# Patient Record
Sex: MALE | Race: WHITE
[De-identification: names, ages, dates, MRNs, and addresses within clinical notes are randomized per-mention and may not be internally consistent; named-entity substitution may affect disease eponyms.]

---

## 2017-08-04 ENCOUNTER — HOSPITAL ENCOUNTER (INPATIENT)
Dept: HOSPITAL 50 - VM.ED | Age: 82
LOS: 4 days | Discharge: HOME | DRG: 190 | End: 2017-08-08
Attending: INTERNAL MEDICINE | Admitting: INTERNAL MEDICINE
Payer: MEDICARE

## 2017-08-04 DIAGNOSIS — E11.21: ICD-10-CM

## 2017-08-04 DIAGNOSIS — I50.31: ICD-10-CM

## 2017-08-04 DIAGNOSIS — D64.9: ICD-10-CM

## 2017-08-04 DIAGNOSIS — G89.29: ICD-10-CM

## 2017-08-04 DIAGNOSIS — E29.1: ICD-10-CM

## 2017-08-04 DIAGNOSIS — I48.91: ICD-10-CM

## 2017-08-04 DIAGNOSIS — M54.9: ICD-10-CM

## 2017-08-04 DIAGNOSIS — F17.210: ICD-10-CM

## 2017-08-04 DIAGNOSIS — I50.23: Primary | ICD-10-CM

## 2017-08-04 DIAGNOSIS — J44.1: ICD-10-CM

## 2017-08-04 DIAGNOSIS — E11.65: ICD-10-CM

## 2017-08-04 DIAGNOSIS — J96.01: ICD-10-CM

## 2017-08-04 DIAGNOSIS — Z79.4: ICD-10-CM

## 2017-08-04 DIAGNOSIS — E66.9: ICD-10-CM

## 2017-08-04 DIAGNOSIS — M25.559: ICD-10-CM

## 2017-08-04 DIAGNOSIS — Z79.899: ICD-10-CM

## 2017-08-04 LAB
BASE EXCESS BLDA CALC-SCNC: 2 MMOL/L (ref -2–3)
CHLORIDE SERPL-SCNC: 98 MMOL/L (ref 98–107)
HCO3 BLDA-SCNC: 28 MMOL/L (ref 22–26)
O2 FLOW RATE: 3 L/MIN
PCO2 BLDA: 53 MMHG (ref 35–45)
PO2 BLDA: 78 MMHG (ref 80–105)
SODIUM SERPL-SCNC: 133 MMOL/L (ref 136–145)

## 2017-08-04 PROCEDURE — 83735 ASSAY OF MAGNESIUM: CPT

## 2017-08-04 PROCEDURE — 85025 COMPLETE CBC W/AUTO DIFF WBC: CPT

## 2017-08-04 PROCEDURE — 84484 ASSAY OF TROPONIN QUANT: CPT

## 2017-08-04 PROCEDURE — 36415 COLL VENOUS BLD VENIPUNCTURE: CPT

## 2017-08-04 PROCEDURE — 82803 BLOOD GASES ANY COMBINATION: CPT

## 2017-08-04 PROCEDURE — 36600 WITHDRAWAL OF ARTERIAL BLOOD: CPT

## 2017-08-04 PROCEDURE — 71010: CPT

## 2017-08-04 PROCEDURE — 83880 ASSAY OF NATRIURETIC PEPTIDE: CPT

## 2017-08-04 PROCEDURE — 96361 HYDRATE IV INFUSION ADD-ON: CPT

## 2017-08-04 PROCEDURE — 96375 TX/PRO/DX INJ NEW DRUG ADDON: CPT

## 2017-08-04 PROCEDURE — 99284 EMERGENCY DEPT VISIT MOD MDM: CPT

## 2017-08-04 PROCEDURE — 99285 EMERGENCY DEPT VISIT HI MDM: CPT

## 2017-08-04 PROCEDURE — 96374 THER/PROPH/DIAG INJ IV PUSH: CPT

## 2017-08-04 PROCEDURE — 94640 AIRWAY INHALATION TREATMENT: CPT

## 2017-08-04 PROCEDURE — 93005 ELECTROCARDIOGRAM TRACING: CPT

## 2017-08-04 PROCEDURE — 80053 COMPREHEN METABOLIC PANEL: CPT

## 2017-08-04 RX ADMIN — Medication PRN ML: at 14:50

## 2017-08-04 RX ADMIN — Medication SCH MG: at 10:00

## 2017-08-04 RX ADMIN — INSULIN ASPART SCH UNITS: 100 INJECTION, SUSPENSION SUBCUTANEOUS at 17:31

## 2017-08-04 RX ADMIN — VITAMIN D, TAB 1000IU (100/BT) SCH UNITS: 25 TAB at 10:00

## 2017-08-04 RX ADMIN — Medication SCH MG: at 17:34

## 2017-08-04 RX ADMIN — DILTIAZEM HYDROCHLORIDE SCH MG: 180 CAPSULE, COATED, EXTENDED RELEASE ORAL at 10:01

## 2017-08-04 RX ADMIN — INSULIN ASPART SCH UNITS: 100 INJECTION, SUSPENSION SUBCUTANEOUS at 09:08

## 2017-08-04 NOTE — HP
HISTORY:  An 82-year-old who came into the emergency room due to respiratory

distress during the middle of the night.  The patient had noted increasing cough

since Wednesday evening.  He had not had any fever or chills.  He had been

coughing up some phlegm.  He is an active smoker at least 2 cigarettes per day

and has known COPD.  He was wheezing.  His last exacerbation was actually a

couple of years ago, but he did not end up getting hospitalized, otherwise he

has diabetes.  He has no history of heart failure, but he has chronic atrial

fibrillation.  His last echo was done in  and showed his EF of 55%.  He is

not on any oxygen at home.  He has not had a recent PFTs to assess the severity

of his COPD.

 

PAST MEDICAL HISTORY:  Lumbar and thoracic compression fractures, so he does

have some kyphosis; male hypogonadism; diabetic nephropathy; chronic anemia, but

improved on iron; chronic COPD; hyperlipidemia; active smoking; chronic atrial

fibrillation, on Eliquis; type 2 diabetes, controlled on insulin with

complications of diabetic nephropathy; osteoporosis; posttraumatic arthritis of

the knee; right hip pain with probably some radicular pain from the spine, but

with also moderate to severe osteoarthritis.  He did get a trochanteric bursitis

injection on  by Ortho.

 

PAST SURGICAL HISTORY:  He has had a previous colonoscopy.

 

FAMILY HISTORY:  He does have a brother who has atrial fibrillation and who has

had heart failure and diabetes.  He also has MS.  He has a sister who is living.

His parents are both .  Mom had heart failure and diabetes.  Dad had

COPD.

 

SOCIAL HISTORY:  He is .  Lives with his wife at home.  He is retired

from farming.  He does not drink.  He quit after a DUI many years ago.  They

never had any children.  He benito in [place].

 

ALLERGIES:  None.

 

MEDICATIONS:  List is reviewed.  He is on hydrocodone, he usually takes about 1

of the 7.5/325 pills per day; Cardizem 180 daily; NovoLog 70/30, 15 units

b.i.d.; Lisinopril 5 units daily; vitamin D daily; Pravachol 40 mg daily;

Victoza 1.8 daily; Spiriva daily, he is using it; otherwise metformin 1000 mg in

the morning and 500 in the evening; Vistaril as needed for itching; metoprolol

100 mg daily; Eliquis 5 mg b.i.d.; Flonase; ferrous gluconate; and vitamin C.

 

REVIEW OF SYSTEMS:

General:  He has actually lost some weight over the last 6 months or so.  He is

down over 15 pounds.  He has not gained any weight back recently, but his wife

has noted more fluid retention.

HEENT:  Otherwise no sore throat.

Cardiac:  No chest pain.  He has chronic atrial fibrillation.

Lungs:  Otherwise respiratory as stated in HPI.

Abdomen:  No new abdominal pain, nausea, vomiting or diarrhea.

Musculoskeletal:  He continues to have the right hip pain.

Neurologic:  He has no issues with memory.

 

Otherwise all systems reviewed and found to be negative unless otherwise stated.

 

PHYSICAL EXAMINATION:

Vital signs:  His weight was 97.2 kg, temperature 97.6, in fact clinic weight

was 97.6 kg just last week.  His pulse is 70, blood pressure 150/75, respiratory

rate 20, O2 94 on 2 L, but he was 88% on room air.

General:  He was in no acute distress.

Cardiac:  His heart was irregularly irregular.

Respiratory:  His lungs sounds were decreased with expiratory rhonchi

bilaterally.  No wheezing was appreciated.

Abdomen:  Mildly distended, but soft, nontender.

Extremities:  Warm and dry.  He does have 2+ edema to his mid shin.

Mental status:  He is alert and orientated x3.

Back:  His spine is kyphotic which is not new.

 

LABORATORY DATA:  EKG was reviewed which did show him to be in atrial flutter.

When compared to his last EKG at [place] from  was mostly similar

appearance, but actually the rate is better, it was 113 then, it is 82 now.

Otherwise, it should be noted that his chest x-ray did not show any infiltrates.

Otherwise lab work did show a normal white count of 9.4, hemoglobin 12,

platelets 156.  ABGs showed a pH of 7.33, pCO2 53, PO2 78, bicarb 28, sodium

133, potassium 5.1, chloride 98, bicarb 27, BUN 19, creatinine 0.9, glucose 177,

calcium 8.6.  ALT, AST, bilirubin all normal.  Albumin 3.4, proBNP 5101

 

ASSESSMENT:

1. Acute hypoxic respiratory failure secondary to a chronic obstruction

    pulmonary disease exacerbation.

2. Chronic obstruction pulmonary disease exacerbation with known underlying

    chronic obstruction pulmonary disease but unknown severity.

3. Likely right heart failure due to the chronic obstruction pulmonary disease

    with some acute on chronic diastolic heart failure which is a new onset

    with a known ejection fraction of 55% back in 2013.

4. Type 2 diabetes, controlled on long-term insulin with complications of

    nephropathy.

5. Chronic hypogonadism with osteoporosis and previous compression fractures.

6. Chronic back and hip pain, on minimal doses of hydrocodone like 1 pill per

    day.

7. Osteoarthritis of the knees.

8. Mild chronic anemia.

9. Chronic atrial fibrillation, on Eliquis.

 

PLAN:  At this point, the patient will be admitted for acute cares.  He did

receive 125 of Solu-Medrol in the ER and nebulizers and also 20 mg of Lasix.  At

this point, I am going to schedule Lasix 20 mg IV twice daily.  I will hold off

on any further IV steroids for now.  I will schedule DuoNeb 3 times a day and

have them available p.r.n.  I will hold his Spiriva at this point because he is

already going to be receiving the short-acting anticholinergics.  We can

consider adding long-acting bronchodilators or possibly even discharging on

newer medications like Stiolto, but we will treat the current exacerbation

first.  Also start him on oral doxycycline 100 mg twice daily to complete at

least a 1-week course.  He will continue on his home medications.  For insulin,

we will do q.i.d. Accu-Cheks.  We will also do telemetry.  For DVT prophylaxis,

he is already covered with Eliquis.  I will check a mag and troponin level.  We

will repeat lab work in the morning.  The patient will be followed by Dr. Perez

over the weekend.

 

 

GLEN:  2017 09:52:47  MODL:  2017 10:40:38

Job #:  [id number]

## 2017-08-04 NOTE — EDM.PDOC
[id number]: SOB


Time Seen by Provider: 08/04/17 05:51


Source of Information: Reports: Patient, EMS, EMS Notes Reviewed, Family


History Limitations: Reports: No Limitations





- History of Present Illness


INITIAL COMMENTS - FREE TEXT/NARRATIVE: 


Wife and pt states the pt has been SOB the last couple days. It has slowly been 

getting worse prompting a 911 call this am for transport to the ER. Pt states 

he has had a productive cough which is normal for him. Pt denies any fever but 

does state he had a "sinus infection" a few days ago with a lot of drainage. 


Onset: Gradual


Onset Date: 08/02/17


Severity: Moderate


Improves with: Reports: Other (O2)


Worsens with: Reports: Movement


Associated Symptoms: Reports: Cough, cough w sputum, Shortness of Breath.  

Denies: Confusion, Chest Pain, Diaphoresis, Fever/Chills, Headaches, Loss of 

Appetite, Malaise, Nausea/Vomiting, Syncope, Weakness


Treatments PTA: Reports: Oxygen (EMS placed pt on 4L O2-92%. RA-85-88%)





- Related Data


 Allergies











Allergy/AdvReac Type Severity Reaction Status Date / Time


 


No Known Allergies Allergy   Verified 08/04/17 06:22











Home Meds: 


 Home Meds





Apixaban [Eliquis] 5 mg PO BID 08/04/17 [History]


Ascorbic Acid [Vitamin C] 1,000 mg PO DAILY 08/04/17 [History]


Diltiazem [Cardizem CD] 180 mg PO DAILY 08/04/17 [History]


Ferrous Gluconate 65 mg PO DAILY 08/04/17 [History]


Fluticasone Propionate [Flonase] 1 spray NASBOTH BID 08/04/17 [History]


Hydrocodone/Acetaminophen [Hydrocodon-Acetaminoph 7.5-325] 1 - 2 each PO Q6H 

PRN 08/04/17 [History]


Insuln Asp Prot/Insulin Aspart [NovoLOG Mix 70-30] 15 unit SUBCUT BIDMEALS 08/04 /17 [History]


Liraglutide [Victoza] 1.8 mg SUBCUT DAILY 08/04/17 [History]


Lisinopril [Prinivil] 5 mg PO DAILY 08/04/17 [History]


Metoprolol Succinate [Toprol XL] 100 mg PO DAILY 08/04/17 [History]


Pravastatin Sodium [Pravachol] 40 mg PO DAILY 08/04/17 [History]


Terbinafine [LamISIL AT] 30 gm .XX BID 08/04/17 [History]


Tiotropium [Spiriva HandiHaler] 18 mcg INH DAILY 08/04/17 [History]


hydrOXYzine Pamoate [Vistaril] 25 mg PO TID PRN 08/04/17 [History]


metFORMIN HCl [Glucophage] 1,000 mg PO BID 08/04/17 [History]











ED ROS GENERAL





- Review of Systems


Review Of Systems: See Below


Constitutional: Reports: No Symptoms.  Denies: Fever, Chills, Malaise, Weakness

, Fatigue


HEENT: Reports: No Symptoms


Respiratory: Reports: Shortness of Breath, Wheezing, Cough


Cardiovascular: Reports: No Symptoms, Other (Chronic A.fib).  Denies: Chest Pain

, Blood Pressure Problem


Endocrine: Reports: No Symptoms


GI/Abdominal: Reports: No Symptoms


: Reports: No Symptoms


Musculoskeletal: Reports: No Symptoms


Skin: Reports: No Symptoms.  Denies: Cyanosis, Mottled, Pallor, Diaphoresis, 

Change in Hair/Nails


Neurological: Reports: No Symptoms.  Denies: Confusion, Dizziness, Headache, 

Numbness, Seizure


Psychiatric: Reports: No Symptoms


Hematologic/Lymphatic: Reports: No Symptoms


Immunologic: Reports: No Symptoms





ED EXAM, GENERAL





- Physical Exam


Exam: See Below


Exam Limited By: No Limitations


General Appearance: Alert, WD/WN, Moderate Distress


Nose: Normal Inspection, Normal Mucosa


Throat/Mouth: Normal Inspection, Normal Lips.  No: Dysphagia, Inflammation, 

Perioral Cyanosis


Neck: Normal Inspection, Supple, Non-Tender


Respiratory/Chest: Respiratory Distress, Crackles, Wheezing, Accessory Muscle 

Use, Retractions, Prolonged Expiration.  No: Lungs Clear, Normal Breath Sounds


Cardiovascular: Gallop/S4, Irregularly Irregular (3+ edema lower extremities).  

No: No Edema


GI/Abdominal: Normal Bowel Sounds, Soft, Non-Tender, No Distention, No Abnormal 

Bruit, No Mass.  No: Rigid, Rebound, Tender


Neurological: Alert, Oriented


Psychiatric: Normal Affect, Normal Mood


Skin Exam: Warm, Dry





EKG INTERPRETATION


EKG Date: 08/04/17


Time: 06:34


Rhythm: A-Fib





Course





- Vital Signs


Last Recorded V/S: 


 Last Vital Signs











Temp  36.2 C   08/04/17 05:51


 


Pulse  82   08/04/17 07:03


 


Resp  24 H  08/04/17 07:03


 


BP  131/71   08/04/17 05:51


 


Pulse Ox  98   08/04/17 07:03














- Orders/Labs/Meds


Orders: 


 Active Orders 24 hr











 Category Date Time Status


 


 Chest 1V Frontal [CR] Stat Exams  08/04/17 05:56 Taken


 


 Sodium Chloride 0.9% [Normal Saline] 1,000 ml Med  08/04/17 06:00 Active





 IV ASDIRECTED   


 


 Sodium Chloride 0.9% [Saline Flush] Med  08/04/17 05:57 Active





 10 ml FLUSH ASDIRECTED PRN   


 


 Peripheral IV Insertion Adult [OM.PC] Routine Oth  08/04/17 05:57 Ordered








 Medication Orders





Sodium Chloride (Normal Saline)  1,000 mls @ 100 mls/hr IV ASDIRECTED ARABELLA


   Last Admin: 08/04/17 07:06  Dose: 100 mls/hr


Sodium Chloride (Saline Flush)  10 ml FLUSH ASDIRECTED PRN


   PRN Reason: Keep Vein Open








Labs: 


 Laboratory Tests











  08/04/17 08/04/17 08/04/17 Range/Units





  06:10 06:10 06:10 


 


WBC  9.4    (4.0-10.0)  x10^3/uL


 


RBC  3.84 L    (4.5-6.0)  x10^6/uL


 


Hgb  12.0 L    (14.0-18.0)  g/dL


 


Hct  36.6 L    (40.0-52.0)  %


 


MCV  95.3 H    (78.0-93.0)  fL


 


MCH  31.3    (26.0-32.0)  pg


 


MCHC  32.8    (32.0-36.0)  g/dL


 


RDW Coeff of Brandon  13.7    (10.0-15.0)  %


 


Plt Count  156    (130-400)  x10^3/uL


 


Neut % (Auto)  85.8 H    (50.0-80.0)  %


 


Lymph % (Auto)  3.7 L    (25.0-50.0)  %


 


Mono % (Auto)  10.0    (2.0-11.0)  %


 


Eos % (Auto)  0.3    (0.0-4.0)  %


 


Baso % (Auto)  0.2    (0.2-1.2)  %


 


ABG pH    7.33 L  (7.35-7.45)  


 


ABG pCO2    53 H  (35-45)  mmHG


 


ABG pO2    78 L  ()  mmHG


 


ABG HCO3    28 H  (22-26)  mmol/L


 


ABG Total CO2    30 H  (23-27)  mmol/L


 


ABG O2 Content    94 L  (95-98)  %


 


ABG Base Excess    2  (-2-3)  mmol/L


 


A-a Gradient    TNP  


 


Oxygen Flow Rate    3  L/min


 


FiO2    0.32  


 


Sodium   133 L   (136-145)  mmol/L


 


Potassium   5.1   (3.5-5.1)  mmol/L


 


Chloride   98   ()  mmol/L


 


Carbon Dioxide   27   (21-32)  mmol/L


 


BUN   19 H   (7-18)  mg/dL


 


Creatinine   0.9   (0.70-1.30)  mg/dL


 


Est Cr Clr Drug Dosing   TNP   


 


Estimated GFR (MDRD)   > 60   


 


Glucose   177 H   ()  mg/dL


 


Calcium   8.6   (8.5-10.1)  mg/dL


 


Corrected Calcium   9.08   (8.5-10.1)  mg/dL


 


Total Bilirubin   0.6   (0.2-1.0)  mg/dL


 


AST   18   (15-37)  U/L


 


ALT   21   (16-63)  U/L


 


Alkaline Phosphatase   60   ()  U/L


 


B-Natriuretic Peptide   5101 H   (<=450)  pg/mL


 


Total Protein   6.9   (6.4-8.2)  g/dL


 


Albumin   3.4   (3.4-5.0)  g/dL


 


Globulin   3.5   


 


Albumin/Globulin Ratio   0.97   











Meds: 


Medications











Generic Name Dose Route Start Last Admin





  Trade Name Freq  PRN Reason Stop Dose Admin


 


Sodium Chloride  1,000 mls @ 100 mls/hr  08/04/17 06:00  08/04/17 07:06





  Normal Saline  IV   100 mls/hr





  ASDIRECTED ARABELLA   Administration


 


Sodium Chloride  10 ml  08/04/17 05:57  





  Saline Flush  FLUSH   





  ASDIRECTED PRN   





  Keep Vein Open   














Discontinued Medications














Generic Name Dose Route Start Last Admin





  Trade Name Freq  PRN Reason Stop Dose Admin


 


Albuterol/Ipratropium  3 ml  08/04/17 05:55  08/04/17 05:58





  Duoneb 3.0-0.5 Mg/3 Ml  NEB  08/04/17 05:56  3 ml





  ONETIME ONE   Administration


 


Furosemide  20 mg  08/04/17 06:12  08/04/17 06:28





  Lasix  IV  08/04/17 06:13  20 mg





  ONETIME ONE   Administration


 


Methylprednisolone Sodium Succinate  125 mg  08/04/17 05:58  08/04/17 06:26





  Solu-Medrol  IVPUSH  08/04/17 05:59  125 mg





  ONETIME ONE   Administration














- Re-Assessments/Exams


Free Text/Narrative Re-Assessment/Exam: 





08/04/17 07:57


After duoneb wheezing sounds throughout the lung fluids have diminished. Pt is 

still requiring 2L O2 to maintain levels around 95%. Respirations are 20-22. 

Crackles are heard throughout. Discussed the Case with Dr. Peterson who agrees 

to admit the pt. Will complete transfer and place tuck in orders. Admitting 

provider will see pt on the floor.Educated the pt and wife regarding findings 

and the plan for admission. 





Departure





- Departure


Time of Disposition: 07:45


Disposition: Admitted As Inpatient 66


Condition: Fair


Clinical Impression: 


CHF (congestive heart failure)


Qualifiers:


 Congestive heart failure type: systolic Congestive heart failure chronicity: 

acute on chronic Qualified Code(s): I50.23 - Acute on chronic systolic (

congestive) heart failure








- Discharge Information





- My Orders


Last 24 Hours: 


My Active Orders





08/04/17 05:56


Chest 1V Frontal [CR] Stat 





08/04/17 05:57


Sodium Chloride 0.9% [Saline Flush]   10 ml FLUSH ASDIRECTED PRN 


Peripheral IV Insertion Adult [OM.PC] Routine 





08/04/17 06:00


Sodium Chloride 0.9% [Normal Saline] 1,000 ml IV ASDIRECTED 














- Assessment/Plan


Last 24 Hours: 


My Active Orders





08/04/17 05:56


Chest 1V Frontal [CR] Stat 





08/04/17 05:57


Sodium Chloride 0.9% [Saline Flush]   10 ml FLUSH ASDIRECTED PRN 


Peripheral IV Insertion Adult [OM.PC] Routine 





08/04/17 06:00


Sodium Chloride 0.9% [Normal Saline] 1,000 ml IV ASDIRECTED

## 2017-08-05 LAB
CHLORIDE SERPL-SCNC: 95 MMOL/L (ref 98–107)
SODIUM SERPL-SCNC: 136 MMOL/L (ref 136–145)

## 2017-08-05 RX ADMIN — DILTIAZEM HYDROCHLORIDE SCH MG: 180 CAPSULE, COATED, EXTENDED RELEASE ORAL at 07:43

## 2017-08-05 RX ADMIN — Medication SCH MG: at 17:44

## 2017-08-05 RX ADMIN — Medication SCH MG: at 07:46

## 2017-08-05 RX ADMIN — Medication SCH MG: at 10:19

## 2017-08-05 RX ADMIN — INSULIN ASPART SCH UNITS: 100 INJECTION, SUSPENSION SUBCUTANEOUS at 17:43

## 2017-08-05 RX ADMIN — INSULIN ASPART SCH UNITS: 100 INJECTION, SUSPENSION SUBCUTANEOUS at 07:44

## 2017-08-05 RX ADMIN — Medication PRN ML: at 14:39

## 2017-08-05 RX ADMIN — Medication PRN ML: at 07:43

## 2017-08-05 RX ADMIN — VITAMIN D, TAB 1000IU (100/BT) SCH UNITS: 25 TAB at 07:43

## 2017-08-05 NOTE — PN
Progress Note for GILBERT BARBER  Date:  08/05/2017  Room #:  VM.206

 

SUBJECTIVE:  An 82-year-old white male admitted on 08/04/2017 with respiratory

distress, short shortness of breath and cough with diagnosis of acute hypoxic

respiratory failure secondary to COPD along with right-sided congestive heart

failure secondary to the COPD.  He had been given DuoNeb and IV Solu-Medrol in

the emergency room and then admitted with DuoNeb, started on some doxycycline

and given some IV Lasix for diuresis.

 

He is feeling better this morning, less short of breath.  He had a good night.

Slept fairly well.  States he had urinary incontinence episode during the night.

Denies any significant discomfort, still has little bit of cough.

 

OBJECTIVE:  General:  He is alert.  He is afebrile.

Vital signs:  His weight is down 4 pounds today.  His O2 sats are 95% on 2.5 L.

Blood pressure is 137/89, pulse is 119.  Monitor shows atrial fibrillation,

stable.

Cardiac:  Heart is irregular.

Respiratory:  Lungs have markedly diminished breath sounds throughout, otherwise

clear.

Abdomen:  Obese, nontender.  No masses palpable.

Extremities:  Trace to 1+ edema which was reported as improved from admission.

 

LABORATORY DATA:  White count is 11.0, hemoglobin 11.4, creatinine is 1.0.

Glucose this morning was 247, last night it was elevated at 345, felt secondary

to the Solu-Medrol.  Magnesium remains low at 1.2.  LFTs are normal.  His proBNP

yesterday was 5100.  Chest x-ray from yesterday shows COPD, no acute process.

 

ASSESSMENT:

1. Acute hypoxic respiratory failure secondary to chronic obstructive

    pulmonary disease - improved.

2. Chronic obstructive pulmonary disease with acute exacerbation - improved.

3. Right-sided congestive heart failure, felt secondary to chronic obstructive

    pulmonary disease.

4. Hypomagnesemia.

 

PLAN:

1. We will continue IV Lasix diuresis through today.

2. Continue DuoNeb t.i.d. and doxycycline.

3. Add magnesium sulfate 2 g IV today.

4. Follow up labs tomorrow.  Continue to monitor condition. Questions

    answered.

 

 

FM:  08/05/2017 10:10:21  MODL:  08/05/2017 11:09:48

Job #:  [id number]

RACHAEL

## 2017-08-06 LAB
CHLORIDE SERPL-SCNC: 92 MMOL/L (ref 98–107)
SODIUM SERPL-SCNC: 133 MMOL/L (ref 136–145)

## 2017-08-06 RX ADMIN — Medication SCH MG: at 09:37

## 2017-08-06 RX ADMIN — TIOTROPIUM BROMIDE SCH MCG: 18 CAPSULE ORAL; RESPIRATORY (INHALATION) at 10:14

## 2017-08-06 RX ADMIN — Medication SCH MG: at 08:01

## 2017-08-06 RX ADMIN — DILTIAZEM HYDROCHLORIDE SCH MG: 180 CAPSULE, COATED, EXTENDED RELEASE ORAL at 07:56

## 2017-08-06 RX ADMIN — INSULIN ASPART SCH UNITS: 100 INJECTION, SUSPENSION SUBCUTANEOUS at 18:10

## 2017-08-06 RX ADMIN — HYDROCODONE BITARTRATE AND ACETAMINOPHEN PRN TAB: 5; 325 TABLET ORAL at 02:15

## 2017-08-06 RX ADMIN — BUDESONIDE SCH MG: 0.5 SUSPENSION RESPIRATORY (INHALATION) at 20:03

## 2017-08-06 RX ADMIN — INSULIN ASPART SCH UNITS: 100 INJECTION, SUSPENSION SUBCUTANEOUS at 08:02

## 2017-08-06 RX ADMIN — BUDESONIDE SCH MG: 0.5 SUSPENSION RESPIRATORY (INHALATION) at 10:15

## 2017-08-06 RX ADMIN — Medication SCH MG: at 18:08

## 2017-08-06 RX ADMIN — VITAMIN D, TAB 1000IU (100/BT) SCH UNITS: 25 TAB at 07:54

## 2017-08-06 NOTE — PN
Progress Note for GILBERT BARBER  Date:  08/06/2017  Room #:  VM.206

 

SUBJECTIVE:  An 82-year-old white male has been hospitalized for acute hypoxic

respiratory failure secondary to COPD and right-sided congestive heart failure

secondary to COPD.  He has been diuresed with IV Lasix and has lost a total of 7

pounds since admission, he is down an additional 3 pounds yesterday.

 

He states his breathing is better, though still not back to baseline.  Still has

a cough.  Denies any chest pain.  Did not sleep well last night.

 

Nursing staff reports that his heart rate got up overnight, it is running in the

110s to 120 range.  He is in atrial fib.  He is already on oral Cardizem and

Toprol-XL.

 

OBJECTIVE:  General:  He is alert.  He is afebrile.

Vital Signs:  Pulse is 100 to 120 range, irregular, blood pressure is 148/96,

and O2 sats 94% on 2 L.

Heart:  Irregular.

Lungs:  Have occasional wheezing and rhonchi.

ABDOMEN:  Soft, nontender.  No masses.

Extremities:  Trace edema.

 

His weight today is 207 pounds, on admission weight was 214 pounds.

 

LABORATORY DATA:  White count 8.0, hemoglobin 11.6, stable.  Electrolytes are

unremarkable.  Creatinine is stable at 1.0.  Magnesium remains low at 1.2.

 

ASSESSMENT:

1. Acute hypoxic respiratory failure secondary to chronic obstructive

    pulmonary disease - improved.

2. Chronic obstructive pulmonary disease with acute exacerbation.

3. Right-sided congestive heart failure, felt secondary to chronic obstructive

    pulmonary disease - improved.

4. Hypomagnesemia.

 

PLAN:

1. Suspect that his tachycardia may be due to his DuoNebs as he does not

    use them at home.  Order for patient DuoNeb just on a p.r.n. basis and stop

    his scheduled ones.

2. Add Spiriva 1 puff daily.

3. Pulmicort b.i.d.

4. Continue magnesium orally and add magnesium sulfate 2 g IV

    today.  Repeat magnesium this afternoon.  Repeat labs in the morning.  Dr. Chayito Peterson will assume care in the morning.

 

 

FM:  08/06/2017 09:55:35  MODL:  08/06/2017 11:03:40

Job #:  [id number]

RACHAEL

## 2017-08-07 LAB
CHLORIDE SERPL-SCNC: 95 MMOL/L (ref 98–107)
SODIUM SERPL-SCNC: 137 MMOL/L (ref 136–145)

## 2017-08-07 RX ADMIN — Medication SCH MG: at 09:07

## 2017-08-07 RX ADMIN — ARFORMOTEROL TARTRATE SCH MCG: 15 SOLUTION RESPIRATORY (INHALATION) at 09:19

## 2017-08-07 RX ADMIN — INSULIN ASPART SCH: 100 INJECTION, SUSPENSION SUBCUTANEOUS at 07:43

## 2017-08-07 RX ADMIN — DILTIAZEM HYDROCHLORIDE SCH MG: 180 CAPSULE, COATED, EXTENDED RELEASE ORAL at 07:36

## 2017-08-07 RX ADMIN — INSULIN ASPART SCH: 100 INJECTION, SUSPENSION SUBCUTANEOUS at 17:31

## 2017-08-07 RX ADMIN — HYDROCODONE BITARTRATE AND ACETAMINOPHEN PRN TAB: 5; 325 TABLET ORAL at 07:47

## 2017-08-07 RX ADMIN — BUDESONIDE SCH MG: 0.5 SUSPENSION RESPIRATORY (INHALATION) at 19:59

## 2017-08-07 RX ADMIN — TIOTROPIUM BROMIDE SCH MCG: 18 CAPSULE ORAL; RESPIRATORY (INHALATION) at 07:38

## 2017-08-07 RX ADMIN — Medication SCH MG: at 07:38

## 2017-08-07 RX ADMIN — INSULIN ASPART SCH UNITS: 100 INJECTION, SUSPENSION SUBCUTANEOUS at 16:12

## 2017-08-07 RX ADMIN — BUDESONIDE SCH MG: 0.5 SUSPENSION RESPIRATORY (INHALATION) at 06:07

## 2017-08-07 RX ADMIN — VITAMIN D, TAB 1000IU (100/BT) SCH UNITS: 25 TAB at 07:36

## 2017-08-07 RX ADMIN — Medication SCH MG: at 17:32

## 2017-08-07 RX ADMIN — ARFORMOTEROL TARTRATE SCH MCG: 15 SOLUTION RESPIRATORY (INHALATION) at 20:00

## 2017-08-07 NOTE — PN
Progress Note for GILBERT BARBER  Date: 8/7/2017  Room #:  VM.206

 

SUBJECT:  This is hospital day #4 on an 82-year-old, admitted with a COPD and

CHF exacerbation.  The patient is more wheezy this morning.  He feels his cough

and breathing are better.  He did not get any further steroids over the weekend.

His nebulizers were stopped yesterday due to tachycardia.  He has known atrial

fibrillation.  Otherwise, he denies any chest pain.  His leg swelling is

significantly better.  His weight is down 8 pounds.  Overall, he has been

afebrile.

 

OBJECTIVE:  Vital Signs:  Temperature is 97.2, pulse 64, blood pressure 121/72,

respiratory rate 18, and O2 of 97% on 3 L.  He dropped down to like 82% on room

air and refused to go home with oxygen today.  His pulse at one point, was 105.

General:  He is in no acute distress heart regularly irregular.

Lungs:  Sounds are decreased with inspiratory and expiratory wheezes throughout

but no crackles

Abdomen:  Positive bowel sounds.  Soft and nontender.

Extremities:  Warm and dry.  No edema.

Mental Status:  Alert and orientated x3.

 

LABORATORY DATA:  Lab work does show a normal white count 7.5, hemoglobin 12.7,

kidney function normal with creatinine 0.9, magnesium still low at 1.5, glucose

level low this morning at 58, as the patient reports he did need much for

supper.

 

ASSESSMENT:

1. Chronic obstructive pulmonary disease exacerbation with chronic obstructive

    pulmonary disease and active smoking.  Brovana will be added today.  We

    will do some IV Solu-Medrol home, O2 evaluation.  We will try for home

    later today, otherwise, tomorrow.  We will continue doxycycline.

2. Acute hypoxic respiratory failure secondary to chronic obstructive

    pulmonary disease and heart failure.  Seems to be improving.  We will

    repeat an x-ray today because of continued hypoxia.

3. Chronic obstructive pulmonary disease or chronic diastolic heart failure.

    New onset with EF of 55% back in 2013.  We will need to arrange an echo

    outpatient.  We will continue his oral Lasix.  He is tolerating that.

4. Type 2 diabetes, controlled on long-term insulin use with complications of

    nephropathy.  We will hold his a.m. insulin and give it to him earlier than

    supper.  We will continue to monitor Accu-Cheks.  We will hold off on any

    extra sliding scale unless blood sugars were to go up over 350.

5. Chronic back and hip pain.  He still has a little pain today he has

    hydrocodone available.

6. Mild chronic anemia.

7. Chronic atrial fibrillation on Eliquis.

 

PLAN:  At this point, today patient will likely continue acute cares, so we will

give him IV Solu-Medrol he is hopeful to try to go home but is hesitant to use

oxygen.  We will continue his nebulizers with budesonide and Brovana, We will

continue oral Lasix.  No lab work should be needed tomorrow.  We will get him up

with therapies and RT.

 

 

MKA:  08/07/2017 17:16:31  MODL:  08/07/2017 17:36:27

Job #:  [id number]

## 2017-08-08 VITALS — DIASTOLIC BLOOD PRESSURE: 73 MMHG | SYSTOLIC BLOOD PRESSURE: 112 MMHG

## 2017-08-08 RX ADMIN — INSULIN ASPART SCH UNITS: 100 INJECTION, SUSPENSION SUBCUTANEOUS at 08:22

## 2017-08-08 RX ADMIN — VITAMIN D, TAB 1000IU (100/BT) SCH UNITS: 25 TAB at 08:10

## 2017-08-08 RX ADMIN — TIOTROPIUM BROMIDE SCH MCG: 18 CAPSULE ORAL; RESPIRATORY (INHALATION) at 08:17

## 2017-08-08 RX ADMIN — BUDESONIDE SCH MG: 0.5 SUSPENSION RESPIRATORY (INHALATION) at 07:07

## 2017-08-08 RX ADMIN — DILTIAZEM HYDROCHLORIDE SCH MG: 180 CAPSULE, COATED, EXTENDED RELEASE ORAL at 08:10

## 2017-08-08 RX ADMIN — Medication SCH MG: at 08:20

## 2017-08-08 RX ADMIN — Medication SCH MG: at 08:59

## 2017-08-08 RX ADMIN — ARFORMOTEROL TARTRATE SCH MCG: 15 SOLUTION RESPIRATORY (INHALATION) at 07:06

## 2018-05-15 ENCOUNTER — HOSPITAL ENCOUNTER (INPATIENT)
Dept: HOSPITAL 50 - VM.ED | Age: 83
LOS: 3 days | Discharge: NURSING, REHAB OR LONG-TERM CARE | DRG: 291 | End: 2018-05-18
Attending: INTERNAL MEDICINE | Admitting: INTERNAL MEDICINE
Payer: MEDICARE

## 2018-05-15 DIAGNOSIS — I48.91: ICD-10-CM

## 2018-05-15 DIAGNOSIS — D64.9: ICD-10-CM

## 2018-05-15 DIAGNOSIS — F05: ICD-10-CM

## 2018-05-15 DIAGNOSIS — Z79.52: ICD-10-CM

## 2018-05-15 DIAGNOSIS — G47.30: ICD-10-CM

## 2018-05-15 DIAGNOSIS — E78.00: ICD-10-CM

## 2018-05-15 DIAGNOSIS — J44.1: ICD-10-CM

## 2018-05-15 DIAGNOSIS — R06.03: ICD-10-CM

## 2018-05-15 DIAGNOSIS — E87.5: ICD-10-CM

## 2018-05-15 DIAGNOSIS — R05: ICD-10-CM

## 2018-05-15 DIAGNOSIS — R06.00: ICD-10-CM

## 2018-05-15 DIAGNOSIS — I50.43: ICD-10-CM

## 2018-05-15 DIAGNOSIS — Z87.891: ICD-10-CM

## 2018-05-15 DIAGNOSIS — J96.22: ICD-10-CM

## 2018-05-15 DIAGNOSIS — J44.9: ICD-10-CM

## 2018-05-15 DIAGNOSIS — J96.21: ICD-10-CM

## 2018-05-15 DIAGNOSIS — E83.42: ICD-10-CM

## 2018-05-15 DIAGNOSIS — Z79.899: ICD-10-CM

## 2018-05-15 DIAGNOSIS — M81.0: ICD-10-CM

## 2018-05-15 DIAGNOSIS — E11.65: ICD-10-CM

## 2018-05-15 DIAGNOSIS — R32: ICD-10-CM

## 2018-05-15 DIAGNOSIS — E87.1: ICD-10-CM

## 2018-05-15 DIAGNOSIS — I11.0: ICD-10-CM

## 2018-05-15 DIAGNOSIS — E11.9: ICD-10-CM

## 2018-05-15 DIAGNOSIS — I50.9: Primary | ICD-10-CM

## 2018-05-15 DIAGNOSIS — Z91.19: ICD-10-CM

## 2018-05-15 DIAGNOSIS — Z99.81: ICD-10-CM

## 2018-05-15 DIAGNOSIS — E11.21: ICD-10-CM

## 2018-05-15 DIAGNOSIS — T38.0X5A: ICD-10-CM

## 2018-05-15 DIAGNOSIS — Z79.01: ICD-10-CM

## 2018-05-15 DIAGNOSIS — N28.1: ICD-10-CM

## 2018-05-15 DIAGNOSIS — Z79.4: ICD-10-CM

## 2018-05-15 DIAGNOSIS — I16.0: ICD-10-CM

## 2018-05-15 DIAGNOSIS — I48.2: ICD-10-CM

## 2018-05-15 DIAGNOSIS — T50.0X5A: ICD-10-CM

## 2018-05-15 LAB
CHLORIDE SERPL-SCNC: 91 MMOL/L (ref 98–107)
SODIUM SERPL-SCNC: 132 MMOL/L (ref 136–145)

## 2018-05-15 PROCEDURE — 039Y3ZZ DRAINAGE OF UPPER ARTERY, PERCUTANEOUS APPROACH: ICD-10-PCS | Performed by: INTERNAL MEDICINE

## 2018-05-15 RX ADMIN — Medication SCH MG: at 20:17

## 2018-05-15 RX ADMIN — METHYLPREDNISOLONE SODIUM SUCCINATE SCH MG: 40 INJECTION, POWDER, FOR SOLUTION INTRAMUSCULAR; INTRAVENOUS at 20:19

## 2018-05-15 RX ADMIN — VITAMIN D, TAB 1000IU (100/BT) SCH UNITS: 25 TAB at 11:58

## 2018-05-15 RX ADMIN — METHYLPREDNISOLONE SODIUM SUCCINATE SCH: 40 INJECTION, POWDER, FOR SOLUTION INTRAMUSCULAR; INTRAVENOUS at 11:59

## 2018-05-15 RX ADMIN — INSULIN ASPART SCH UNITS: 100 INJECTION, SUSPENSION SUBCUTANEOUS at 12:40

## 2018-05-15 RX ADMIN — INSULIN ASPART SCH UNITS: 100 INJECTION, SUSPENSION SUBCUTANEOUS at 17:41

## 2018-05-15 RX ADMIN — Medication SCH MG: at 11:55

## 2018-05-15 NOTE — HP
CHIEF COMPLAINT:  Weakness and confusion.

 

HISTORY OF PRESENT ILLNESS:  This is an 83-year-old male with known history of

heart failure and atrial fibrillation along with COPD who has had several weeks

of increasing shortness of breath.  He was hospitalized for pneumonia back in

January in [place].  At that time, he was found to have a lung function, FEV1

around 30%.  He also had an EF around 40%.  He had recently been in with faster

heart rates and had been started on digoxin.  Prior to that, Toprol had been

increased.  He has not had any fever or chills.  He has been coughing but has

not necessarily had any worsening of a productive cough.  He quit smoking within

the last year.  He was so weak, the wife had to call the ambulance this morning.

In fact, when I went and examined him later, he did not even remember why he

came in.  Through the ER, he did have a CO2 of 74.  He did receive 40 mg of

Lasix and a DuoNeb through the ambulance.  Heart rate was controlled.  He was in

atrial flutter on EKG with no acute changes.  His pulse has been around 70 to

90.

 

PAST MEDICAL HISTORY:

1. Severe COPD with FEV1 around 30%.

2. Chronic systolic heart failure with EF of 40%.

3. Uncontrolled type 2 diabetes with complications, on long-term insulin use.

4. Hypomagnesemia.

5. Atrial fibrillation.

6. Pulmonary infiltrate on the right lung, chronic since January, but

    persistent on recent CT.

7. Cyst of the left kidney.

8. Previous compression fracture, lumbar and thoracic osteoporosis with

    pathologic fractures.

9. Hypogonadism in a male.

10.Chronic anemia.

11.Diabetic nephropathy.

12.Long-term anticoagulation with Eliquis, on oxygen 1 L at night.

13.Previous chronic diastolic heart failure, EF was 60% back in 09/2017.

 

PAST SURGICAL HISTORY:  The patient has had a colonoscopy.

 

FAMILY HISTORY:  He has a mother with heart failure who passed away.  She also

had type 2 diabetes.  Father had COPD.  He has two sisters who are living, a

brother who passed away.  A brother who is living has MS.

 

SOCIAL HISTORY:  He no longer drinks alcohol.  He benito in [place] with his

wife.  He is .  He has no children.  He is retired from farming.  He quit

smoking in the last year.

 

ALLERGIES:  None.

 

MEDICATIONS:  His medical list was reviewed.  He is on lisinopril 2.5 mg daily,

digoxin 125 mcg daily, mag oxide 400 b.i.d., Aldactone 25 daily, Pravachol 40

daily, Neurontin 300 at bedtime, DuoNeb four times a day per [place]

pulmonologist, Victoza 1.8 daily, Toprol 100 mg daily, Xyzal 5 mg daily,

Vistaril three times a day as needed for itching, metformin 2000 mg daily,

NovoLog 16 units twice daily, Eliquis 5 mg twice a day, vitamin D daily, iron

daily, Lamisil cream as needed, vitamin C 1000 daily.  Actually for the Lasix,

at one point in the last couple weeks, it was up to 40 but after his visit

around 05/02, he was supposed to remain on 20 and was supposed to take that

every day because he does have a history of skipping doses.

 

REVIEW OF SYSTEMS:

General:  He has not weighed himself, so he does not know if he is gaining

weight, but he has been weak.  No fever and no chills.

HEENT:  No sore throat.

Cardiac:  No chest pain.  He denies palpitations.

Respiratory:  He has had a cough, shortness of breath.  He has been orthopneic.

He has been sleeping in the chair.

Abdominal:  No nausea, vomiting, or abdominal pain.  A couple days ago, he had

some loose stools but that seems to be better.  He has had some trouble

tolerating magnesium pills due to loose stools.

Musculoskeletal:  Otherwise, he has had some back pain in the past.  He has no

worsening of that currently.  :  No problem passing his urine.

Neurologic:  He denies confusion, although his wife feels that he has been

confused.  Otherwise, all systems reviewed and found to be negative unless

otherwise stated.

 

PHYSICAL EXAMINATION:

Vital Signs:  In the emergency room this morning when I evaluated him, he was

afebrile.  His temperature was 98.7, his pulse 81, blood pressure 162/31

initially but later when I examined was down to 124/93, respiratory rate

originally is 32, down to 20, O2 76 on room air, improved to 96 on 4 L, but did

go down into the 80s and he was tripoding.

General:  He is in no acute distress.

Cardiac:  His heart is irregularly irregular.

Respiratory:  Lung sounds are decreased throughout with both inspiratory and

expiratory wheezing.

Abdomen:  Soft and nontender.

Extremities:  Warm and dry.  He has 1+ edema to the mid-shin.

Back:  He has kyphosis.

Mental Status:  He seems alert enough.  He is orientated that he is in the

hospital and he seems to recognize me, but he is not aware of the date or any

other details.  He also later could not remember the events from earlier in the

day.

Skin:  Warm and dry without diaphoresis.  There is no redness.

Psych:  He is not depressed or agitated.  However, later he became a little bit

agitated with nursing trying to give him his pills.  His judgment is

inappropriate.  He thinks he is okay to go home.

HEENT:  His oral mucosa is moist without posterior pharynx erythema.

Neck:  Supple without lymphadenopathy.  Due to his obesity.  I could not

appreciate any JVD.

 

STUDIES:  EKG again showed the atrial flutter.  No acute ST changes.

 

LABORATORY DATA:  White count 5.6, hemoglobin 11.5, platelets 195.  INR 1.  PH

7.3, pCO2 of 74, PO2 of 103 on 0.36 FiO2.  Sodium 132, potassium 5.3, chloride

91, bicarb 37, BUN 16, creatinine 0.8, glucose 140, lactic acid normal at 0.7,

calcium 8.9, magnesium 1.4.  ALT, AST normal at 18.  Troponin 0.02, normal.  CRP

1.9.  ProBNP 4452.  Albumin 3.7.

 

Chest x-ray again shows increased pulmonary vascular congestion.

 

ASSESSMENT AND PLAN:

1. Acute on chronic combo systolic and diastolic heart failure exacerbation.

2. Acute hypoxic and hypercapnic respiratory failure on chronic hypoxic

    respiratory failure, although he is only on oxygen 1 L at night.  Cause for

    the respiratory failure is heart failure and chronic obstructive pulmonary

    disease.

3. Severe chronic obstructive pulmonary disease with FEV1 around 30% with

    acute exacerbation.

4. Diabetes type 2, controlled.

5. Chronic anemia with hypogonadism, hemoglobin at baseline.

6. Mild hyperkalemia, probably due to recent Aldactone.

7. Mild hyponatremia probably due to heart failure.

8. Hypomagnesemia.  We will replace IV due to a history of loose stools.

9. Chronic atrial fibrillation, rate controlled.  We will monitor with

    telemetry.  We will continue his digoxin.  We will continue the Toprol at

    100 currently.

10.Questionable history of sleep apnea.  The patient thought he had a machine

    at home but then his wife did not feel like they followed through with

    things for that and plus they do quite a bit of doctoring also in [place].

    Given his confusion, we did give him BiPAP.

11.Acute encephalopathy due to hypercapnia, seemed to improve after the

    patient was treated with BiPAP.  We will repeat ABGs if needed depending on

    his clinical condition as the patient is a do not intubate or resuscitate.

12.Essential hypertension with hypertensive urgency due to heart failure.

    This is already improving with diuresis.

 

PLAN:  The patient is admitted for acute care.  I will schedule Lasix 40 mg IV

twice daily.  The patient had no urinary retention.  He is emptying his bladder

okay.  We will monitor him with telemetry.  We will do strict in's and out's and

daily weights.  I will also put him on some Solu-Medrol 40 mg twice daily,

although his wheezing could be more from heart failure.  I am going to hold off

on any antibiotics as he has had recent rounds of them and has no fever or

purulent sputum to suggest this is a COPD exacerbation.  He did not take the

Ceftin that was recommended to him actually in the end of April.  We will go

ahead and get some fungal serologies given his persistent chest x-ray findings,

but again I do not feel he is acutely sick or septic.  The patient will also be

evaluated by Physical Therapy.  For DVT prophylaxis he will be on Eliquis.

 

 

MKA:  05/15/2018 15:10:56  MODL:  05/15/2018 21:58:06

Job #:  [id number]

## 2018-05-15 NOTE — EDM.PDOC
ED HPI GENERAL MEDICAL PROBLEM





- General


Chief Complaint: General


Stated Complaint: DECREASED RESPONSIVENESS, INCREASED DYSPNEA


Time Seen by Provider: 05/15/18 05:54


Source of Information: Reports: Patient, Family


History Limitations: Reports: Respiratory Distress





- History of Present Illness


INITIAL COMMENTS - FREE TEXT/NARRATIVE: 


Pt. complains of severe respiratory distress. He has a history of CHF and has 

been hospitalized for this in the past. He denies any fever or chills.


Pt. is experiencing some productive cough. EMS was summoned to the pt. 

residence. He was given lasix 40mg IV and a breathing treatment prior to 

arrival to ER.


Pt. denies any sick contacted. No nausea, no chest pain or dyspnea.


Denies any increased peripheral edema. No nausea or vomiting.


Location: Reports: Chest, Generalized


Quality: Reports: Burning


Severity: Severe


Improves with: Reports: Rest


Worsens with: Reports: Cold Therapy


Context: Reports: Exercise





- Related Data


 Allergies











Allergy/AdvReac Type Severity Reaction Status Date / Time


 


No Known Allergies Allergy   Verified 05/15/18 06:20











Home Meds: 


 Home Meds





Apixaban [Eliquis] 5 mg PO BID 08/04/17 [History]


Ascorbic Acid [Vitamin C] 1,000 mg PO DAILY 08/04/17 [History]


Diltiazem [Cardizem CD] 180 mg PO DAILY 08/04/17 [History]


Ergocalciferol (Vitamin D2) [Vitamin D2] 2,000 unit PO DAILY 08/04/17 [History]


Ferrous Gluconate 65 mg PO DAILY 08/04/17 [History]


Hydrocodone/Acetaminophen [Hydrocodon-Acetaminoph 7.5-325] 1 - 2 tab PO Q6H PRN 

08/04/17 [History]


Insuln Asp Prot/Insulin Aspart [NovoLOG Mix 70-30] 15 unit SUBCUT BIDMEALS 08/04 /17 [History]


Liraglutide [Victoza] 1.8 mg SUBCUT DAILY 08/04/17 [History]


Lisinopril [Prinivil] 5 mg PO DAILY 08/04/17 [History]


Metoprolol Succinate [Toprol XL] 100 mg PO DAILY 08/04/17 [History]


Pravastatin Sodium [Pravachol] 40 mg PO DAILY 08/04/17 [History]


Terbinafine [LamISIL AT 1% Crm] 1 applic TOP BID 08/04/17 [History]


Triamcinolone Acetonide [Kenalog] 1 applic TP TID PRN 08/04/17 [History]


hydrOXYzine Pamoate [Vistaril] 25 mg PO TID PRN 08/04/17 [History]


Furosemide [Lasix] 20 mg PO DAILY #30 tablet 08/08/17 [Rx]


Albuterol/Ipratropium [DuoNeb 3.0-0.5 MG/3 ML] 1 dose INH QID 05/15/18 [History]


Levocetirizine Dihydrochloride [Xyzal] 5 mg PO DAILY 05/15/18 [History]


Magnesium Chloride [Mag Delay] 2 tab PO BID 05/15/18 [History]


Magnesium Chloride [Mag-64] 400 mg PO TID 05/15/18 [History]


Prednisone [IJD: predniSONE] 10 mg PO DAILY 05/15/18 [History]


metFORMIN HCl [Metformin HCl ER] 2 tab PO DAILY 05/15/18 [History]











Past Medical History


Cardiovascular History: Reports: Afib, Heart Failure, High Cholesterol, 

Hypertension


Respiratory History: Reports: COPD


Endocrine/Metabolic History: Reports: Diabetes, Type II





Social & Family History





- Tobacco Use


Smoking Status *Q: Former Smoker


Used Tobacco, but Quit: Yes


Month/Year Tobacco Last Used: ?





ED ROS GENERAL





- Review of Systems


Review Of Systems: See Below


Constitutional: Reports: Fever, Chills


HEENT: Reports: No Symptoms


Respiratory: Reports: Cough, Other (productive)


Cardiovascular: Reports: No Symptoms, Dyspnea on Exertion, Orthopnea, PND


Endocrine: Reports: No Symptoms


GI/Abdominal: Reports: No Symptoms


: Reports: No Symptoms


Musculoskeletal: Reports: No Symptoms


Skin: Reports: No Symptoms


Neurological: Reports: No Symptoms


Psychiatric: Reports: No Symptoms


Hematologic/Lymphatic: Reports: No Symptoms


Immunologic: Reports: No Symptoms





ED EXAM, GENERAL





- Physical Exam


Exam: See Below


Exam Limited By: No Limitations


General Appearance: Alert, WD/WN, Lethargic, Mild Distress


Eye Exam: Bilateral Eye: EOMI, PERRL


Ears: Normal External Exam, Normal Canal, Hearing Grossly Normal, Normal TMs


Ear Exam: Bilateral Ear: Auricle Normal, Canal Normal, TM normal


Nose: Normal Inspection, Normal Mucosa, No Blood


Throat/Mouth: Normal Inspection, Normal Lips, Normal Teeth, Normal Gums, Normal 

Oropharynx, Normal Voice, No Airway Compromise


Head: Atraumatic, Normocephalic


Neck: Normal Inspection, Supple, Non-Tender, Full Range of Motion


Respiratory/Chest: No Respiratory Distress, Lungs Clear, Normal Breath Sounds, 

No Accessory Muscle Use, Chest Non-Tender


Cardiovascular: Normal Peripheral Pulses, Regular Rate, Rhythm, No Edema, No 

Gallop, No JVD, No Murmur, No Rub


Peripheral Pulses: 4+: Radial (L), Radial (R)


GI/Abdominal: Normal Bowel Sounds, Soft, Non-Tender, No Organomegaly, No 

Distention, No Abnormal Bruit, No Mass


 (Male) Exam: Deferred


Rectal (Males) Exam: Normal Exam, Normal Rectal Tone, Prostate Normal


Back Exam: Normal Inspection, Full Range of Motion, NT


Extremities: Normal Inspection, Normal Range of Motion, Non-Tender, No Pedal 

Edema, Normal Capillary Refill, Other (no edema)


Neurological: Alert, Oriented, CN II-XII Intact, Normal Cognition, Normal Gait, 

Normal Reflexes, No Motor/Sensory Deficits


Psychiatric: Normal Affect, Normal Mood


Skin Exam: Warm, Dry, Intact, Normal Color, No Rash


Lymphatic: No Adenopathy





Course





- Vital Signs


Last Recorded V/S: 


 Last Vital Signs











Temp  37.1 C   05/15/18 05:51


 


Pulse  81   05/15/18 05:51


 


Resp  32 H  05/15/18 05:51


 


BP  148/73 H  05/15/18 06:21


 


Pulse Ox  76 L  05/15/18 05:51














- Orders/Labs/Meds


Orders: 


 Active Orders 24 hr











 Category Date Time Status


 


 EKG Documentation Completion [RC] STAT Care  05/15/18 06:01 Active


 


 Chest 1V Frontal [CR] Stat Exams  05/15/18 06:05 Ordered


 


 ABG [BLOOD GAS ARTERIAL] [BG] Stat Lab  05/15/18 06:06 Ordered


 


 COMPREHENSIVE METABOLIC PN,CMP [CHEM] Stat Lab  05/15/18 05:58 Ordered


 


 CRP [C-REACTIVE PROTEIN] [CHEM] Stat Lab  05/15/18 05:58 Ordered


 


 INR,PT,PROTHROMBIN TIME [COAG] Stat Lab  05/15/18 06:01 Ordered


 


 LACTIC ACID [CHEM] Stat Lab  05/15/18 06:02 Ordered


 


 PRO B-TYPE NATRIUR PEPT,BNPPRO [CHEM] Stat Lab  05/15/18 05:58 Ordered


 


 Sodium Chloride 0.9% [Saline Flush] Med  05/15/18 06:01 Active





 10 ml FLUSH ASDIRECTED PRN   


 


 Peripheral IV Insertion Adult [OM.PC] Routine Oth  05/15/18 06:04 Ordered








 Medication Orders





Sodium Chloride (Saline Flush)  10 ml FLUSH ASDIRECTED PRN


   PRN Reason: Keep Vein Open








Labs: 


 Laboratory Tests











  05/15/18 Range/Units





  06:18 


 


WBC  5.6  (4.0-10.0)  x10^3/uL


 


RBC  3.83 L  (4.5-6.0)  x10^6/uL


 


Hgb  11.5 L  (14.0-18.0)  g/dL


 


Hct  37.9 L  (40.0-52.0)  %


 


MCV  99.0 H D  (78.0-93.0)  fL


 


MCH  30.0  (26.0-32.0)  pg


 


MCHC  30.3 L  (32.0-36.0)  g/dL


 


RDW Coeff of [place]  14.2  (10.0-15.0)  %


 


Plt Count  195  (130-400)  x10^3/uL


 


Neut % (Auto)  76.4  (50.0-80.0)  %


 


Lymph % (Auto)  12.3 L  (25.0-50.0)  %


 


Mono % (Auto)  10.7  (2.0-11.0)  %


 


Eos % (Auto)  0.4  (0.0-4.0)  %


 


Baso % (Auto)  0.2  (0.2-1.2)  %











Meds: 


Medications











Generic Name Dose Route Start Last Admin





  Trade Name Freq  PRN Reason Stop Dose Admin


 


Sodium Chloride  10 ml  05/15/18 06:01  





  Saline Flush  FLUSH   





  ASDIRECTED PRN   





  Keep Vein Open   





     





     





     














Discontinued Medications














Generic Name Dose Route Start Last Admin





  Trade Name Freq  PRN Reason Stop Dose Admin


 


Sodium Chloride  10 ml  05/15/18 06:03  





  Saline Flush  FLUSH   





  ASDIRECTED PRN   





  Keep Vein Open   





     





     





     














Departure





- Discharge Information


Forms:  ED Department Discharge





- My Orders


Last 24 Hours: 


My Active Orders





05/15/18 05:58


COMPREHENSIVE METABOLIC PN,CMP [CHEM] Stat 


CRP [C-REACTIVE PROTEIN] [CHEM] Stat 


PRO B-TYPE NATRIUR PEPT,BNPPRO [CHEM] Stat 





05/15/18 06:01


EKG Documentation Completion [RC] STAT 


INR,PT,PROTHROMBIN TIME [COAG] Stat 


Sodium Chloride 0.9% [Saline Flush]   10 ml FLUSH ASDIRECTED PRN 





05/15/18 06:02


LACTIC ACID [CHEM] Stat 





05/15/18 06:04


Peripheral IV Insertion Adult [OM.PC] Routine 





05/15/18 06:05


Chest 1V Frontal [CR] Stat 





05/15/18 06:06


ABG [BLOOD GAS ARTERIAL] [BG] Stat 














- Assessment/Plan


Last 24 Hours: 


My Active Orders





05/15/18 05:58


COMPREHENSIVE METABOLIC PN,CMP [CHEM] Stat 


CRP [C-REACTIVE PROTEIN] [CHEM] Stat 


PRO B-TYPE NATRIUR PEPT,BNPPRO [CHEM] Stat 





05/15/18 06:01


EKG Documentation Completion [RC] STAT 


INR,PT,PROTHROMBIN TIME [COAG] Stat 


Sodium Chloride 0.9% [Saline Flush]   10 ml FLUSH ASDIRECTED PRN 





05/15/18 06:02


LACTIC ACID [CHEM] Stat 





05/15/18 06:04


Peripheral IV Insertion Adult [OM.PC] Routine 





05/15/18 06:05


Chest 1V Frontal [CR] Stat 





05/15/18 06:06


ABG [BLOOD GAS ARTERIAL] [BG] Stat

## 2018-05-16 LAB
CHLORIDE SERPL-SCNC: 88 MMOL/L (ref 98–107)
SODIUM SERPL-SCNC: 129 MMOL/L (ref 136–145)

## 2018-05-16 RX ADMIN — VITAMIN D, TAB 1000IU (100/BT) SCH UNITS: 25 TAB at 07:37

## 2018-05-16 RX ADMIN — Medication SCH MG: at 20:26

## 2018-05-16 RX ADMIN — METHYLPREDNISOLONE SODIUM SUCCINATE SCH MG: 40 INJECTION, POWDER, FOR SOLUTION INTRAMUSCULAR; INTRAVENOUS at 07:36

## 2018-05-16 RX ADMIN — Medication SCH MG: at 18:25

## 2018-05-16 RX ADMIN — Medication SCH: at 07:38

## 2018-05-16 RX ADMIN — Medication SCH MG: at 07:36

## 2018-05-16 RX ADMIN — INSULIN ASPART SCH UNITS: 100 INJECTION, SUSPENSION SUBCUTANEOUS at 07:37

## 2018-05-16 RX ADMIN — INSULIN ASPART SCH UNITS: 100 INJECTION, SUSPENSION SUBCUTANEOUS at 18:26

## 2018-05-16 NOTE — PN
Progress Note for GILBERT BARBER  Date:  05/16/2018  Room #:  VM.203

 

SUBJECTIVE:  This is an 83-year-old, admitted with confusion and weakness due to

acute hypoxic and hypercapnic respiratory failure.  He did receive some BiPAP

yesterday during the day.  He was switched over to a bedside CPAP unit during

the night, which he tolerated.  He was having some confusion.  He was refusing

some cares.  He was trying to push staff away at times.  He did get an order for

Seroquel, which he took last evening and slept quite well by his report.  He has

not had any chest pain.  His breathing is better.  He denies any significant

cough.  He has been afebrile.  Otherwise, he has been getting IV Lasix.  He has

been incontinent of urine.  His residuals were negative.  Therefore, no Esquivel

was placed.  He did have heart rates that went up just over 100 this morning.

Otherwise, have been under better control.  He is having looser stools, but is

on magnesium.  He did get some IV magnesium oxide yesterday.

 

OBJECTIVE:  VITAL SIGNS:  His weight 94.1 kg.  Temperature 98.5, pulse 92, blood

pressure 115/72, respiratory rate 19, and O2 of 92% on 3 L.

GENERAL:  He is in no acute distress.

HEART:  Irregularly irregular.

LUNGS:  Lung sounds are decreased with rhonchi throughout.  No wheezing

appreciated this morning.

ABDOMEN:  Soft, nontender, and nondistended.

EXTREMITIES:  Warm and dry.  Just trace edema.  Support stockings in place.

MENTAL STATUS:  He is alert.  He is oriented x2.  He is aware of that he is at

the hospital and the date, but he could not think of my name, but he seems to

recognize me.

 

LABORATORY DATA:  Lab work does show white count normal 5.8, hemoglobin 11.5,

and platelets 187.  Sodium 129, potassium 5.2, chloride 88, bicarb 36, BUN 27,

creatinine 1, glucose up to 223 and magnesium 1.6.

 

ASSESSMENT:

1. Acute hypoxic and hypercapnic respiratory failure.  BiPAP will be available

    for him if needed.  He seems to clinically be improving.  No ABGs will be

    repeated unless his clinical condition changes.

2. Acute on chronic combo systolic and diastolic heart failure exacerbation.

    We will continue IV Lasix.  We will monitor daily weights and ins and outs.

3. Severe chronic obstructive pulmonary disease with exacerbation.  We will

    decrease IV steroids once daily.

4. Diabetes type 2 with hyperglycemia due to steroids.  We will continue his

    same insulin.

5. Chronic anemia with hypogonadism.  Hemoglobin is stable.

6. Mild hyperkalemia.  We will continue to monitor.  He is off Aldactone.

7. Hyponatremia, worsening today.  We will repeat tomorrow.

8. Chronic atrial fibrillation, rate controlled.  We will continue to monitor

    telemetry.  We will make adjustments to his medications if needed.

9. Possible sleep apnea.  He has CPAP available.

10.Encephalopathy due to acute illness.  We will continue to monitor this.

    Seroquel will be available p.r.n.

11.Essential hypertension.  Blood pressures are controlled.

 

PLAN:  At this point, the patient will continue diuresis with IV Lasix.  We will

repeat lab work tomorrow.  PT will evaluate him for his strength.  Anticipate he

may even need a swing bed stay.  Consider OT for cognitive eval, but will let

him improve medically first.

 

 

WANDAA:  05/16/2018 08:29:01  MODL:  05/16/2018 08:45:14

Job #:  [id number]

## 2018-05-17 LAB
CHLORIDE SERPL-SCNC: 91 MMOL/L (ref 98–107)
SODIUM SERPL-SCNC: 131 MMOL/L (ref 136–145)

## 2018-05-17 RX ADMIN — Medication SCH MG: at 07:35

## 2018-05-17 RX ADMIN — Medication SCH MG: at 07:45

## 2018-05-17 RX ADMIN — VITAMIN D, TAB 1000IU (100/BT) SCH UNITS: 25 TAB at 07:34

## 2018-05-17 RX ADMIN — FUROSEMIDE SCH MG: 10 INJECTION, SOLUTION INTRAMUSCULAR; INTRAVENOUS at 15:00

## 2018-05-17 RX ADMIN — Medication SCH MG: at 19:26

## 2018-05-17 RX ADMIN — INSULIN ASPART SCH UNITS: 100 INJECTION, SUSPENSION SUBCUTANEOUS at 17:20

## 2018-05-17 RX ADMIN — INSULIN ASPART SCH UNITS: 100 INJECTION, SUSPENSION SUBCUTANEOUS at 07:46

## 2018-05-17 NOTE — PN
Progress Note for GILBERT BARBER  Date:  05/17/2018  Room #:  VM.203

 

SUBJECTIVE:  This is hospital day #3 on an 83-year-old, admitted with acute on

chronic hypoxic and hypercapnic respiratory failure.  The patient refused BiPAP

yesterday, but he is breathing well on his own.  He is beginning to cough more.

He is coughing up yellow sputum.  His breathing is improved.  He had 2400

output.  He is not having any chest pain.  No abdominal pain.  He has had couple

of bowel movements per day, but no diarrhea.

 

Mental Status:  He was disoriented.  He did not know where he was at this

morning with the nurse.  However, he is aware of now he is in the hospital.  He

had to thing for a while when he was aware he is in [place].  He recognized

me immediately when I entered the room.  Yesterday, he was able to give the

date.  We did not do date orientation this morning.  Otherwise, he has been

afebrile.

 

OBJECTIVE:  Vital Signs:  His weight is 94 kg, which is basically the same as

yesterday.  Pulse 84, blood pressure 136/73, respiratory rate 18, and O2 of 97%

on 2 L.  He has had some heart rates up to 130 on telemetry, but currently he is

at 89.

General:  He is in no acute distress.

Heart:  Regularly irregular.

Lungs:  Sounds showed expiratory wheezing and rhonchi throughout in all lobes.

Abdomen:  Nondistended, nontender.

Extremities:  Warm and dry.  No edema.

Mental Status:  Again, he is alert and orientated x2 this morning.  He did pull

out his IV, but it was replaced by the nurse.

 

LABORATORY DATA:  Lab work shows a white count 6.9, hemoglobin 11.1, and

platelets 201.  Sodium 131, potassium 4.6, chloride 91, bicarb 37, BUN 39,

creatinine 1, and glucose 111.

 

ASSESSMENT AND PLAN:

1. Acute hypoxic and hypercapnic respiratory failure, clinically improving

    with IV diuresis.

2. Cough with severe chronic obstructive pulmonary disease, probable

    exacerbation.  He has already been on IV steroids.  We will switch it over

    to oral today.  Get an x-ray and probably start an antibiotic if there is

    pneumonia, of course that I will change the selection.

3. Acute on chronic combo systolic and diastolic heart failure.  I am going to

    continue his IV Lasix.

4. Atrial fibrillation with rapid ventricular rates.  I am going to try a

    small increase in Toprol up to 150 mg daily.  We will watch his blood

    pressure closely.

5. Type 2 diabetes with hyperglycemia due to steroids.  He is back on his

    Victoza.  Blood sugars are excellent this morning.  We will keep insulin

    the same.

6. Chronic anemia with hypogonadism.  Hemoglobin stable.

7. Hyponatremia, improved.

8. Possible sleep apnea.  He has CPAP available here, but has refused it.

9. Delirium.  The patient is receiving some p.r.n. Seroquel.  We will continue

    supportive cares.

10.Essential hypertension.  Blood pressure is controlled.

 

PLAN:  At this point, we will get a chest x-ray today.  Decide on antibiotics

for COPD exacerbation.  If there is an infiltrate, we will send for sputum

cultures.  We will continue IV Lasix.  We will get OT to do a cognitive eval.

PT will also be working with him.  Anticipate he will need a swing bed stay for

DVT prophylaxis.  He is therapeutically anticoagulated with Eliquis.



CXR reviewed still appears to be a lot of fluid over that right lung could be 
somewhat

his chronic changes but we will increase lasix to 60 BID for further diuresis 
and add doxycycline

for the COPD with Bronchitis.  

 

 

MKA:  05/17/2018 08:38:56  MODL:  05/17/2018 08:58:14

Job #:  [id number]

[id number]

## 2018-05-18 ENCOUNTER — HOSPITAL ENCOUNTER (INPATIENT)
Dept: HOSPITAL 50 - VM.MS | Age: 83
LOS: 6 days | Discharge: HOME HEALTH | DRG: 291 | End: 2018-05-24
Attending: INTERNAL MEDICINE | Admitting: INTERNAL MEDICINE
Payer: MEDICARE

## 2018-05-18 VITALS — SYSTOLIC BLOOD PRESSURE: 115 MMHG | DIASTOLIC BLOOD PRESSURE: 58 MMHG

## 2018-05-18 DIAGNOSIS — Z79.01: ICD-10-CM

## 2018-05-18 DIAGNOSIS — J96.21: ICD-10-CM

## 2018-05-18 DIAGNOSIS — I11.0: Primary | ICD-10-CM

## 2018-05-18 DIAGNOSIS — G47.39: ICD-10-CM

## 2018-05-18 DIAGNOSIS — M54.9: ICD-10-CM

## 2018-05-18 DIAGNOSIS — E83.42: ICD-10-CM

## 2018-05-18 DIAGNOSIS — J96.22: ICD-10-CM

## 2018-05-18 DIAGNOSIS — D63.8: ICD-10-CM

## 2018-05-18 DIAGNOSIS — Z79.84: ICD-10-CM

## 2018-05-18 DIAGNOSIS — Z79.899: ICD-10-CM

## 2018-05-18 DIAGNOSIS — I48.91: ICD-10-CM

## 2018-05-18 DIAGNOSIS — J44.1: ICD-10-CM

## 2018-05-18 DIAGNOSIS — I50.43: ICD-10-CM

## 2018-05-18 DIAGNOSIS — E11.649: ICD-10-CM

## 2018-05-18 DIAGNOSIS — E87.1: ICD-10-CM

## 2018-05-18 DIAGNOSIS — G89.29: ICD-10-CM

## 2018-05-18 LAB
CHLORIDE SERPL-SCNC: 92 MMOL/L (ref 98–107)
SODIUM SERPL-SCNC: 132 MMOL/L (ref 136–145)

## 2018-05-18 RX ADMIN — FUROSEMIDE SCH MG: 10 INJECTION, SOLUTION INTRAMUSCULAR; INTRAVENOUS at 07:58

## 2018-05-18 RX ADMIN — VITAMIN D, TAB 1000IU (100/BT) SCH UNITS: 25 TAB at 08:00

## 2018-05-18 RX ADMIN — INSULIN ASPART SCH UNIT: 100 INJECTION, SUSPENSION SUBCUTANEOUS at 17:19

## 2018-05-18 RX ADMIN — INSULIN ASPART SCH UNITS: 100 INJECTION, SUSPENSION SUBCUTANEOUS at 08:01

## 2018-05-18 RX ADMIN — Medication SCH MG: at 08:00

## 2018-05-18 RX ADMIN — Medication SCH MG: at 19:44

## 2018-05-19 RX ADMIN — INSULIN ASPART SCH UNIT: 100 INJECTION, SUSPENSION SUBCUTANEOUS at 08:29

## 2018-05-19 RX ADMIN — Medication SCH MG: at 20:30

## 2018-05-19 RX ADMIN — VITAMIN D, TAB 1000IU (100/BT) SCH UNITS: 25 TAB at 08:18

## 2018-05-19 RX ADMIN — Medication SCH MG: at 08:17

## 2018-05-19 RX ADMIN — Medication SCH MG: at 08:25

## 2018-05-19 RX ADMIN — INSULIN ASPART SCH UNIT: 100 INJECTION, SUSPENSION SUBCUTANEOUS at 17:49

## 2018-05-20 RX ADMIN — INSULIN ASPART SCH UNIT: 100 INJECTION, SUSPENSION SUBCUTANEOUS at 08:06

## 2018-05-20 RX ADMIN — Medication SCH MG: at 08:08

## 2018-05-20 RX ADMIN — Medication SCH MG: at 20:35

## 2018-05-20 RX ADMIN — INSULIN ASPART SCH UNIT: 100 INJECTION, SUSPENSION SUBCUTANEOUS at 17:47

## 2018-05-20 RX ADMIN — Medication SCH MG: at 07:46

## 2018-05-20 RX ADMIN — VITAMIN D, TAB 1000IU (100/BT) SCH UNITS: 25 TAB at 07:47

## 2018-05-21 LAB
CHLORIDE SERPL-SCNC: 89 MMOL/L (ref 98–107)
SODIUM SERPL-SCNC: 130 MMOL/L (ref 136–145)

## 2018-05-21 RX ADMIN — INSULIN ASPART SCH UNIT: 100 INJECTION, SUSPENSION SUBCUTANEOUS at 18:04

## 2018-05-21 RX ADMIN — INSULIN ASPART SCH UNIT: 100 INJECTION, SUSPENSION SUBCUTANEOUS at 07:42

## 2018-05-21 RX ADMIN — VITAMIN D, TAB 1000IU (100/BT) SCH UNITS: 25 TAB at 07:43

## 2018-05-21 RX ADMIN — Medication SCH MG: at 07:43

## 2018-05-21 RX ADMIN — Medication SCH MG: at 07:42

## 2018-05-21 RX ADMIN — Medication SCH MG: at 19:43

## 2018-05-21 NOTE — DISCH
PRIMARY DISCHARGE DIAGNOSES:

1. Acute-on-chronic hypoxic and hypercapnic respiratory failure secondary to a

    heart failure exacerbation and chronic obstructive pulmonary disease

    exacerbation.

2. Severe chronic obstructive pulmonary disease with acute exacerbation.

3. 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

4. Atrial fibrillation with rapid ventricular rate, improved with Toprol

    increased to 150 mg daily.

5. Type 2 diabetes due to steroids, improved with restarting Victoza.

6. Chronic anemia with hypogonadism, stable hemoglobin of 12.1 on discharge.

7. Hyponatremia, improved, probably due to congestive heart failure.  Sodium

    was 132 on discharge.

8. Hypomagnesemia, replaced IV and orally.

9. Possible sleep apnea.  He did use some auto-PAP/BiPAP settings, but had

    been refusing.  This did improve his CO2 level.

10.Delirium due to hypoxia, hypercapnia, hyponatremia.  Receiving some

    Seroquel.  Cognitively, he is improving.  CT of the head was negative.

11.Essential hypertension.  Blood pressure is under control.

 

REASON FOR ADMISSION:  On the day of admission, this 83-year-old was brought to

the ER because of being weak, confused and short of breath.  He was having some

coughing, but no fevers.  He initially was not started on antibiotics as he had

been on them in the previous weeks; however, he was having more purulent yellow

sputum.  Chest x-ray was repeated, still showed some fluid, no infiltrate, so he

was started on doxycycline.  He also received some Solu-Medrol, but that was

stopped due to hyperglycemia, and oral prednisone was not started as his

wheezing improved, but he continued to have rhonchi.  Otherwise, he did have

some back pain.  He used to be on hydrocodone for previous compression

fractures.  He was receiving some Tylenol, and at the rounding at the end of the

day on 05/18, he had no complaints of pain.  Otherwise, his breathing had

improved; he was weaned down to 2 L.  His ABGs were done on the day of discharge

due to his ongoing confusion.  His pCO2 had improved down to 57, pO2 was 78.  He

had been receiving IV Lasix through his stay.  He did not have any significant

weight loss, and he was not having adequately measured urine output, many of it

was missed, one day it was only 150 mL which obviously was not accurate.  His

leg swelling went down, and his x-ray was showing improvement in fluid;

therefore, he was felt to be receiving adequate diuresis.  He was monitored with

telemetry as well.  Again, some heart rates were up into the 120s, even 130s, it

was intermittent, and with increased metoprolol, he was down in the 80s for the

majority of his stay.

 

DISCHARGE PLANS AND INSTRUCTIONS:  He is going over to swing bed for further

cares.

 

Discharging vitals include a temperature of 97.5, pulse 95, blood pressure

115/58, respiratory rate 20, O2 was 92 on 2 L, and weight was 92.9 kg.

General:  He was in no acute distress.

Heart:  Irregularly irregular.

LUNGS:  Lung sounds did show decreased throughout.  No expiratory wheezing

noted, but he had rhonchi throughout as well.  Overall, though, improved since

admission.

Abdomen:  Nondistended, nontender.

Extremities:  Warm and dry.  He had no edema.

Mental status:  He is alert and orientated x3.  After asking where he was at, he

states "I know I am at a hospital somewhere."  He eventually came up with [facility].  He recognized me, and he did know the date was the 18th.

 

Otherwise, greater than 30 minutes spent on this discharge process.  The patient

will have lab work again Monday for DVT prophylaxis.  He is therapeutically

anticoagulated with Eliquis.  He will stop doxycycline after 5 days.  He will

continue with q.i.d. Accu-Cheks, and again his prednisone was discontinued.  The

patient's Lasix will remain at increased doses of 60 mg twice daily orally.  We

could consider adding Aldactone; however, his potassium levels were mildly

elevated during his stay and were finally down to 4.6 as he had been trialed on

Aldactone as an outpatient.

 

 

MKA:  05/18/2018 16:58:47  MODL:  05/19/2018 04:39:22

Job #:  [id number]

## 2018-05-22 RX ADMIN — Medication SCH MG: at 08:32

## 2018-05-22 RX ADMIN — Medication SCH MG: at 19:30

## 2018-05-22 RX ADMIN — Medication SCH MG: at 08:36

## 2018-05-22 RX ADMIN — INSULIN ASPART SCH UNIT: 100 INJECTION, SUSPENSION SUBCUTANEOUS at 17:24

## 2018-05-22 RX ADMIN — INSULIN ASPART SCH UNIT: 100 INJECTION, SUSPENSION SUBCUTANEOUS at 08:37

## 2018-05-22 RX ADMIN — VITAMIN D, TAB 1000IU (100/BT) SCH UNITS: 25 TAB at 08:32

## 2018-05-22 NOTE — PN
Progress Note for GILBERT BARBER  Date:  05/22/2018  Room #:  VM.203

 

SUBJECTIVE:  This is an 83-year-old on swing bed after acute hypoxic and

hypercapnic respiratory failure with COPD and CHF.  The patient states his

breathing is improved.  He has a little bit of stomach upset, just started this

morning before he ate.  He did eat breakfast.  He has not been nauseated.  He

had a good bowel movement yesterday, small one already today.  He has been

afebrile.  He is not lightheaded or dizzy, but blood pressure did drop slightly

to 96/52.  Yesterday, I decreased his Lasix from 60 b.i.d. to 40 b.i.d. when

blood pressure was 103/54.  He still has some swelling in his legs.  Otherwise,

he has not had any further wheezing.

 

OBJECTIVE:  Vital Signs:  His temperature is 97.3, pulse 79, blood pressure

96/52, respiratory rate is 18, O2 is 97% on 2 L.

General:  He is in no acute distress.

Heart:  Regular.

Lungs:  Lung sounds are decreased throughout, but no wheezes appreciated.

Abdomen:  Nondistended, positive bowel sounds, nontender.

Extremities:  Warm and dry.  Trace edema at the ankles.

Mental Status:  He is alert and orientated x3.  He knows the date because he is

able to read off the calender across the room.  His weight overall is down about

1.5 kg from admission.

 

LABORATORY DATA:  Lab work reviewed from yesterday; normal white count,

hemoglobin stable at 12.8, sodium mildly low at 130, potassium 89, chloride of

89, bicarb 35, BUN 50, creatinine 1.3.  Glucose 86 this morning, they have been

88 yesterday and 117.

 

ASSESSMENT:

1. Deconditioning after an acute stay for hypercapnic and hypoxic respiratory

    failure with congestive heart failure and chronic obstructive pulmonary

    disease, improving.  We will get some incentive spirometry started today.

    I will hold his morning Lasix and continue 40 b.i.d.  We will continue to

    monitor his blood pressure closely.

2. Severe chronic obstructive pulmonary disease with recent exacerbation.  He

    is on nebs.  He has also completed some antibiotics previously with

    doxycycline.

3. Atrial fibrillation, rate controlled.  He got his Toprol this morning.  He

    is on Eliquis.

4. Type 2 diabetes, controlled.  He is on insulin.  We will continue the same.

5. Chronic anemia with hypogonadism.  Hemoglobins are stable.

6. Hyponatremia.  We will continue to monitor.  Repeat a BMP in 2 days.

7. Hypomagnesemia, improved.  He is on oral replacement.  He is having bowel

    movements.

8. Mild stomach upset.  We will continue to monitor.

9. Positional sleep apnea, delirium due to hypoxia, improving.

10.Essential hypertension, controlled with actually low blood pressures,

    probably due to diuresis.

 

PLAN:  At this point, the patient will continue swing bed cares with PT.  He is

hopeful to return home with his wife soon.

 

 

MKA:  05/22/2018 08:53:05  MODL:  05/22/2018 09:09:09

Job #:  [id number]

## 2018-05-23 LAB
CHLORIDE SERPL-SCNC: 92 MMOL/L (ref 98–107)
SODIUM SERPL-SCNC: 131 MMOL/L (ref 136–145)

## 2018-05-23 RX ADMIN — Medication SCH MG: at 19:41

## 2018-05-23 RX ADMIN — Medication SCH: at 08:53

## 2018-05-23 RX ADMIN — VITAMIN D, TAB 1000IU (100/BT) SCH UNITS: 25 TAB at 08:52

## 2018-05-23 RX ADMIN — INSULIN ASPART SCH: 100 INJECTION, SUSPENSION SUBCUTANEOUS at 08:53

## 2018-05-23 RX ADMIN — INSULIN ASPART SCH UNIT: 100 INJECTION, SUSPENSION SUBCUTANEOUS at 17:16

## 2018-05-23 RX ADMIN — Medication SCH MG: at 08:52

## 2018-05-24 VITALS — DIASTOLIC BLOOD PRESSURE: 61 MMHG | SYSTOLIC BLOOD PRESSURE: 96 MMHG

## 2018-05-24 RX ADMIN — INSULIN ASPART SCH UNIT: 100 INJECTION, SUSPENSION SUBCUTANEOUS at 08:28

## 2018-05-24 RX ADMIN — VITAMIN D, TAB 1000IU (100/BT) SCH UNITS: 25 TAB at 08:29

## 2018-05-24 RX ADMIN — Medication SCH MG: at 08:37

## 2018-05-24 RX ADMIN — Medication SCH MG: at 08:26

## 2018-05-24 NOTE — DISCH
PRIMARY DISCHARGE DIAGNOSES:

1. Acute hypoxic and hypercapnic respiratory failure secondary to chronic

    obstructive pulmonary disease and congestive heart failure.

Acute combo on chronic systolic and diastolic heart failure exacerbation with EF

of 40%.

1. Severe chronic obstructive pulmonary disease with exacerbation, treated

    with doxycycline.

2. Atrial fibrillation chronic, rate controlled on discharge.

3. Type 2 diabetes with mild hypoglycemia and elevated lactic acid level.

    Metformin discontinued.

4. Chronic anemia, probably due to hypogonadism.  Hemoglobin stable.

5. Hyponatremia probably due to heart failure.  Sodium was 131 on discharge,

    which was mostly stable.

6. Possible sleep apnea.  He does use oxygen 24 hours a day.

7. Hypomagnesemia, chronic, replaced IV and orally.

8. Delirium due to hypoxia and hypercapnia.  This is improving.  He was

    receiving some Seroquel at bedtime.

9. Essential hypertension with mildly decreased blood pressures, probably due

    to increase in Lasix.  This improved with backing off on his Lasix doses.

    Prior to admission, he was taking furosemide once daily and at times he

    would skip it because of going places.  He was only on 20 mg daily.  He did

    have some persistent infiltrates by CT in the right lower lobe recently

    found as well as left kidney cyst, but at this time that was not causing

    him any symptoms.

 

REASON FOR ADMISSION:  On the date of admission, this 83-year-old, transferred

over from acute cares for a swing bed stay.  He continued to improve

cognitively.  He was up he is working with therapies.  He was walking.  Oxygen

levels were weaned down from 4 to 2 L.  He was not having any chest pain.  His

cough resolved.  His breathing improved.  His leg swelling improved.  He was

having good bowel movements, but he was having a little bit of abdominal

discomfort, so on the next morning, we did check a lactic acid.  It happened to

be 2.2.  White count was normal.  No fevers.  Repeat later in the day was 3.2,

but without any other intervention other than just holding his Lasix it went

down into the normal range at 1.7.  He was on Aldactone as an outpatient, but

potassium was mildly elevated, so this was stopped.  Potassium was 4.5 on

discharge.  His magnesium level was 1.6 on discharge.

 

Discharge vitals include a temperature 97.6, pulse 58, blood pressure 96/61,

respiratory rate 20, O2 100 on 2 L.

General:  He was in no acute distress.

Heart:  Was actually a regular rate and rhythm this morning.  No murmurs

appreciated.

Lungs:  Lungs sounds were improved throughout with some scattered rhonchi.  No

wheezing.

Abdomen:  Not examined.

Extremities:  Warm and dry, just trace ankle edema.

Mental Status:  He is alert and orientated x3. Psych:  He was not depressed or

anxious.

 

DISCHARGE PLANS AND INSTRUCTIONS:  The patient will follow up with Dr. Peterson

in the clinic in 1 week for post hospital followup.  He will have a BMP and mag

drawn by Home Health.  He will resume his Lasix, but at 40 mg in the morning, 20

in the afternoon.  Take digoxin 125 daily to control heart rate.  Continue the

lisinopril at 2.5 mg daily.  Toprol at 150 daily, which was an increase for his

heart rate control.  He will be on 2 L of oxygen at all times.  We did stop his

Cardizem.  He should continue his DuoNebs.  We will look into getting him back

on something like Anoro as well.

 

Face-to-face occurred on this date of 05/24/2017 for home health.  He requires

Home Health for nursing and PT and OT to teach and assess and monitor

medications due to new diagnosis of heart failure with exacerbation.  He has

impaired mobility due to swelling from heart failure as well as chronic back

pain from compression fractures and uses a walker and assist of another to leave

the house.  He also has cognitive impairment, so is not able to currently drive.

I will periodically review this plan of care.

 

 

MKA:  05/24/2018 14:32:56  MODL:  05/24/2018 16:26:53

Job #:  [id number]

## 2018-06-17 NOTE — EDM.PDOC
<MaraMorgan W - Last Filed: 05/15/18 06:51>





ED HPI GENERAL MEDICAL PROBLEM





- General


Chief Complaint: General


Stated Complaint: DECREASED RESPONSIVENESS, INCREASED DYSPNEA


Time Seen by Provider: 05/15/18 05:54


Source of Information: Reports: Patient, Family


History Limitations: Reports: Respiratory Distress





- History of Present Illness


INITIAL COMMENTS - FREE TEXT/NARRATIVE: 


Pt. complains of severe respiratory distress. He has a history of CHF and has 

been hospitalized for this in the past. He denies any fever or chills.


Pt. is experiencing some productive cough. EMS was summoned to the pt. 

residence. He was given lasix 40mg IV and a breathing treatment prior to 

arrival to ER.


Pt. denies any sick contacts. No nausea, no chest pain or dyspnea.


Denies any increased peripheral edema. No nausea or vomiting.


Onset: Sudden


Location: Reports: Chest, Generalized.  Denies: Face


Quality: Reports: Burning


Severity: Severe


Improves with: Reports: Rest


Worsens with: Reports: Cold Therapy


Context: Reports: Exercise


Treatments PTA: Reports: Other Medication(s)





- Related Data


 Allergies











Allergy/AdvReac Type Severity Reaction Status Date / Time


 


No Known Allergies Allergy   Verified 05/15/18 06:20











Home Meds: 


 Home Meds





Apixaban [Eliquis] 5 mg PO BID 08/04/17 [History]


Ascorbic Acid [Vitamin C] 1,000 mg PO DAILY 08/04/17 [History]


Diltiazem [Cardizem CD] 180 mg PO DAILY 08/04/17 [History]


Ergocalciferol (Vitamin D2) [Vitamin D2] 2,000 unit PO DAILY 08/04/17 [History]


Ferrous Gluconate 325 mg PO DAILY 08/04/17 [History]


Hydrocodone/Acetaminophen [Hydrocodon-Acetaminoph 7.5-325] 1 - 2 tab PO Q6H PRN 

08/04/17 [History]


Insuln Asp Prot/Insulin Aspart [NovoLOG Mix 70-30] 16 unit SUBCUT BIDMEALS 08/04 /17 [History]


Liraglutide [Victoza] 1.8 mg SUBCUT DAILY 08/04/17 [History]


Lisinopril [Prinivil] 5 mg PO DAILY 08/04/17 [History]


Metoprolol Succinate [Toprol XL] 100 mg PO DAILY 08/04/17 [History]


Pravastatin Sodium [Pravachol] 40 mg PO DAILY 08/04/17 [History]


Terbinafine [LamISIL AT 1% Crm] 1 applic TOP BID 08/04/17 [History]


Triamcinolone Acetonide [Kenalog] 1 applic TP TID PRN 08/04/17 [History]


hydrOXYzine Pamoate [Vistaril] 25 mg PO TID PRN 08/04/17 [History]


Furosemide [Lasix] 20 mg PO DAILY #30 tablet 08/08/17 [Rx]


Albuterol/Ipratropium [DuoNeb 3.0-0.5 MG/3 ML] 1 dose INH QID 05/15/18 [History]


Levocetirizine Dihydrochloride [Xyzal] 5 mg PO DAILY 05/15/18 [History]


Magnesium Chloride [Mag Delay] 2 tab PO BID 05/15/18 [History]


Magnesium Chloride [Mag-64] 400 mg PO TID 05/15/18 [History]


Prednisone [IJD: predniSONE] 10 mg PO DAILY 05/15/18 [History]


metFORMIN HCl [Metformin HCl ER] 2 tab PO DAILY 05/15/18 [History]











Past Medical History


Cardiovascular History: Reports: Afib, Heart Failure, High Cholesterol, 

Hypertension


Respiratory History: Reports: COPD


Endocrine/Metabolic History: Reports: Diabetes, Type II





Social & Family History





- Tobacco Use


Smoking Status *Q: Former Smoker


Used Tobacco, but Quit: Yes


Month/Year Tobacco Last Used: ?





ED EXAM, GENERAL





- Physical Exam


Free Text/Narrative:: 


Pt. complains of severe respiratory distress. He has a history of CHF and has 

been hospitalized for this in the past. He denies any fever or chills.


Pt. is experiencing some productive cough. EMS was summoned to the pt. 

residence. He was given lasix 40mg IV and a breathing treatment prior to 

arrival to ER.


Pt. denies any sick contacted. No nausea, no chest pain or dyspnea.


Denies any increased peripheral edema. No nausea or vomiting.


Exam Limited By: No Limitations


General Appearance: Alert, WD/WN, Lethargic, Mild Distress


Ears: Normal External Exam, Normal Canal, Hearing Grossly Normal, Normal TMs


Ear Exam: Bilateral Ear: Auricle Normal, Canal Normal, TM normal


Nose: Normal Inspection, Normal Mucosa, No Blood


Throat/Mouth: Normal Inspection, Normal Lips, Normal Teeth, Normal Gums, Normal 

Oropharynx, Normal Voice, No Airway Compromise


Head: Atraumatic, Normocephalic


Neck: Normal Inspection, Supple, Non-Tender, Full Range of Motion


Respiratory/Chest: No Respiratory Distress, Lungs Clear, Normal Breath Sounds, 

No Accessory Muscle Use, Chest Non-Tender


Cardiovascular: Normal Peripheral Pulses, Regular Rate, Rhythm, No Edema, No 

Gallop, No JVD, No Murmur, No Rub


Peripheral Pulses: 4+: Radial (L), Radial (R)


GI/Abdominal: Normal Bowel Sounds, Soft, Non-Tender, No Organomegaly, No 

Distention, No Abnormal Bruit, No Mass


Back Exam: Normal Inspection, Full Range of Motion, NT


Extremities: Normal Inspection, Normal Range of Motion, Non-Tender, No Pedal 

Edema, Normal Capillary Refill, Other (no edema)


Neurological: Alert, Oriented, CN II-XII Intact, Normal Cognition, Normal Gait, 

Normal Reflexes, No Motor/Sensory Deficits


Psychiatric: Normal Affect, Normal Mood


Skin Exam: Warm, Dry, Intact, Normal Color, No Rash


Lymphatic: No Adenopathy





Course





- Vital Signs


Last Recorded V/S: 





 Last Vital Signs











Temp  37.1 C   05/15/18 05:51


 


Pulse  85   05/15/18 07:29


 


Resp  20   05/15/18 07:29


 


BP  124/93 H  05/15/18 07:29


 


Pulse Ox  96   05/15/18 07:29














- Orders/Labs/Meds


Orders: 





 Active Orders 24 hr











 Category Date Time Status


 


 Patient Status [ADT] Routine ADT  05/15/18 07:35 Active


 


 EKG Documentation Completion [RC] STAT Care  05/15/18 06:01 Active


 


 Chest 1V Frontal [CR] Stat Exams  05/15/18 06:05 Taken


 


 Sodium Chloride 0.9% [Saline Flush] Med  05/15/18 06:01 Active





 10 ml FLUSH ASDIRECTED PRN   


 


 Peripheral IV Insertion Adult [OM.PC] Routine Oth  05/15/18 06:04 Ordered








 Medication Orders





Sodium Chloride (Saline Flush)  10 ml FLUSH ASDIRECTED PRN


   PRN Reason: Keep Vein Open








Labs: 





 Laboratory Tests











  05/15/18 05/15/18 05/15/18 Range/Units





  06:18 06:18 06:18 


 


WBC  5.6    (4.0-10.0)  x10^3/uL


 


RBC  3.83 L    (4.5-6.0)  x10^6/uL


 


Hgb  11.5 L    (14.0-18.0)  g/dL


 


Hct  37.9 L    (40.0-52.0)  %


 


MCV  99.0 H D    (78.0-93.0)  fL


 


MCH  30.0    (26.0-32.0)  pg


 


MCHC  30.3 L    (32.0-36.0)  g/dL


 


RDW Coeff of Brandon  14.2    (10.0-15.0)  %


 


Plt Count  195    (130-400)  x10^3/uL


 


Neut % (Auto)  76.4    (50.0-80.0)  %


 


Lymph % (Auto)  12.3 L    (25.0-50.0)  %


 


Mono % (Auto)  10.7    (2.0-11.0)  %


 


Eos % (Auto)  0.4    (0.0-4.0)  %


 


Baso % (Auto)  0.2    (0.2-1.2)  %


 


PT    10.4  (9.6-11.4)  SEC


 


INR    1.0 L  (2.0-3.5)  


 


POC ABG pH     (7.35-7.45)  


 


POC ABG pCO2     (35-45)  mmHG


 


POC ABG pO2     ()  mmHG


 


POC ABG HCO3     (22-26)  mmol/L


 


POC ABG Total CO2     (23-27)  mmol/L


 


POC ABG O2 Sat     (95-98)  %


 


POC ABG Base Excess     (-2-3)  mmol/L


 


POC FiO2     


 


Sodium   132 L   (136-145)  mmol/L


 


Potassium   5.3 H   (3.5-5.1)  mmol/L


 


Chloride   91 L   ()  mmol/L


 


Carbon Dioxide   37 H   (21-32)  mmol/L


 


Anion Gap   9.3 L   (10-20)  mmol/L


 


BUN   16   (7-18)  mg/dL


 


Creatinine   0.8   (0.70-1.30)  mg/dL


 


Est Cr Clr Drug Dosing   TNP   


 


Estimated GFR (MDRD)   > 60   


 


Glucose   140 H   ()  mg/dL


 


Lactic Acid     (0.4-2.0)  mmol/L


 


Calcium   8.7   (8.5-10.1)  mg/dL


 


Corrected Calcium   8.94   (8.5-10.1)  mg/dL


 


Total Bilirubin   0.5   (0.2-1.0)  mg/dL


 


AST   18   (15-37)  U/L


 


ALT   18   (16-63)  U/L


 


Alkaline Phosphatase   68   ()  U/L


 


POC Troponin I     (0.00-0.08)  ng/mL


 


C-Reactive Protein   1.9 H   (<=0.9)  mg/dL


 


NT-Pro-B Natriuret Pep   4452 H   (<=450)  pg/mL


 


Total Protein   7.6   (6.4-8.2)  g/dL


 


Albumin   3.7   (3.4-5.0)  g/dL


 


Globulin   3.9   


 


Albumin/Globulin Ratio   0.95   


 


POC Result Comm     














  05/15/18 05/15/18 05/15/18 Range/Units





  06:18 06:29 06:43 


 


WBC     (4.0-10.0)  x10^3/uL


 


RBC     (4.5-6.0)  x10^6/uL


 


Hgb     (14.0-18.0)  g/dL


 


Hct     (40.0-52.0)  %


 


MCV     (78.0-93.0)  fL


 


MCH     (26.0-32.0)  pg


 


MCHC     (32.0-36.0)  g/dL


 


RDW Coeff of Brandon     (10.0-15.0)  %


 


Plt Count     (130-400)  x10^3/uL


 


Neut % (Auto)     (50.0-80.0)  %


 


Lymph % (Auto)     (25.0-50.0)  %


 


Mono % (Auto)     (2.0-11.0)  %


 


Eos % (Auto)     (0.0-4.0)  %


 


Baso % (Auto)     (0.2-1.2)  %


 


PT     (9.6-11.4)  SEC


 


INR     (2.0-3.5)  


 


POC ABG pH    7.349 L  (7.35-7.45)  


 


POC ABG pCO2    74 H*  (35-45)  mmHG


 


POC ABG pO2    103  ()  mmHG


 


POC ABG HCO3    41 H  (22-26)  mmol/L


 


POC ABG Total CO2    43 H  (23-27)  mmol/L


 


POC ABG O2 Sat    97  (95-98)  %


 


POC ABG Base Excess    15 H  (-2-3)  mmol/L


 


POC FiO2    0.36  


 


Sodium     (136-145)  mmol/L


 


Potassium     (3.5-5.1)  mmol/L


 


Chloride     ()  mmol/L


 


Carbon Dioxide     (21-32)  mmol/L


 


Anion Gap     (10-20)  mmol/L


 


BUN     (7-18)  mg/dL


 


Creatinine     (0.70-1.30)  mg/dL


 


Est Cr Clr Drug Dosing     


 


Estimated GFR (MDRD)     


 


Glucose     ()  mg/dL


 


Lactic Acid  0.7    (0.4-2.0)  mmol/L


 


Calcium     (8.5-10.1)  mg/dL


 


Corrected Calcium     (8.5-10.1)  mg/dL


 


Total Bilirubin     (0.2-1.0)  mg/dL


 


AST     (15-37)  U/L


 


ALT     (16-63)  U/L


 


Alkaline Phosphatase     ()  U/L


 


POC Troponin I   0.02   (0.00-0.08)  ng/mL


 


C-Reactive Protein     (<=0.9)  mg/dL


 


NT-Pro-B Natriuret Pep     (<=450)  pg/mL


 


Total Protein     (6.4-8.2)  g/dL


 


Albumin     (3.4-5.0)  g/dL


 


Globulin     


 


Albumin/Globulin Ratio     


 


POC Result Comm    Called critical res  











Meds: 





Medications











Generic Name Dose Route Start Last Admin





  Trade Name Freq  PRN Reason Stop Dose Admin


 


Sodium Chloride  10 ml  05/15/18 06:01  





  Saline Flush  FLUSH   





  ASDIRECTED PRN   





  Keep Vein Open   





     





     





     














Discontinued Medications














Generic Name Dose Route Start Last Admin





  Trade Name Freq  PRN Reason Stop Dose Admin


 


Sodium Chloride  10 ml  05/15/18 06:03  





  Saline Flush  FLUSH   





  ASDIRECTED PRN   





  Keep Vein Open   





     





     





     














Departure





- Departure


Disposition: Admitted As Inpatient 66


Clinical Impression: 


 CHF, Congestive heart failure








- Discharge Information





- My Orders


Last 24 Hours: 





My Active Orders





05/15/18 07:35


Patient Status [ADT] Routine 














- Assessment/Plan


Last 24 Hours: 





My Active Orders





05/15/18 07:35


Patient Status [ADT] Routine 














<Hamlet Palomo - Last Filed: 05/15/18 08:02>





ED ROS GENERAL





- Review of Systems


Review Of Systems: See Below


Constitutional: Reports: Fever


HEENT: Reports: No Symptoms


Respiratory: Reports: Shortness of Breath, Cough, Sputum


Cardiovascular: Reports: Chest Pain


Endocrine: Reports: No Symptoms


GI/Abdominal: Reports: No Symptoms


: Reports: No Symptoms


Musculoskeletal: Reports: No Symptoms


Skin: Reports: No Symptoms


Neurological: Reports: Weakness


Psychiatric: Reports: No Symptoms


Hematologic/Lymphatic: Reports: No Symptoms


Immunologic: Reports: No Symptoms





ED EXAM, GENERAL





- Physical Exam


Exam: See Below


Exam Limited By: No Limitations


General Appearance: Alert, WD/WN, Lethargic, Mild Distress


Ears: Normal External Exam, Normal Canal, Hearing Grossly Normal, Normal TMs


Ear Exam: Bilateral Ear: Auricle Normal, Canal Normal, TM normal


Nose: Normal Inspection, Normal Mucosa, No Blood


Throat/Mouth: Normal Inspection, Normal Lips, Normal Teeth, Normal Gums, Normal 

Oropharynx, Normal Voice, No Airway Compromise


Head: Atraumatic, Normocephalic


Neck: Normal Inspection, Supple, Non-Tender, Full Range of Motion


Respiratory/Chest: Crackles, Wheezing, Accessory Muscle Use, Prolonged 

Expiration.  No: Lungs Clear, Normal Breath Sounds, No Accessory Muscle Use, 

Chest Non-Tender


GI/Abdominal: Normal Bowel Sounds, Soft, Non-Tender, No Organomegaly, No 

Distention, No Abnormal Bruit, No Mass





Departure





- Departure


Time of Disposition: 08:02


Condition: Good





ED Communication





- ED Communication Date/Time


Date: 05/15/18


Time Called: 07:30





- Discussed Case With (1)


Discussed Case With (1): Admitting Provider (Dr. Chayito Anderson called 

regarding the patient.  She will accept care and admit to acute/inpatient 

status.)





- Problem List & Annotations


(1) CHF, acute on chronic


SNOMED Code(s): 89815041


   Code(s): I50.9 - HEART FAILURE, UNSPECIFIED   Status: Acute   Priority: 

Medium   Current Visit: No   


Qualifiers: 


   Heart failure type: unspecified   Qualified Code(s): I50.9 - Heart failure, 

unspecified   





- Problem List Review


Problem List Initiated/Reviewed/Updated: Yes





- Assessment/Plan


Assessment:: 





CHF exacerbation


Plan: 





Admit to acute with Dr. Peterson as admitting and attending. patient

## 2019-08-30 NOTE — PCM.HP.2
H&P History of Present Illness





- General


Date of Service: 08/30/19


Admit Problem/Dx: 


 Admission Diagnosis/Problem





Admission Diagnosis/Problem      CHF, Congestive heart failure








Source of Information: Patient





- History of Present Illness


Initial Comments - Free Text/Narative: 





83 y/o male admitted as inpatient from ER with complaints of increasing 

shortness of breath. Pt reports SOB began today, ER report from wife indicates 

SOB for 24-48 hours. Pt has a history of COPD, and diastolic CHF. He is a 

recent former smoker having quit approximately 2 years ago, continues to use 

chewing tobacco.  He is on home O2 (2.5-3L/min). Denies use of CPAP. Patient 

reports he has not been able to use nebulizer as the "motor is broke". He c/o 

swelling to his lower extremities. Denies chest pain or palpitations. Denies 

productive cough, nausea or vomiting. Denies dizziness or lightheadedness. 

Denies fever, chills, sweats or decreased appetite. Pt currently takes Lasix 80 

mg in am and 40 mg at noon for CHF.  Hospitalized approximately 1 year ago for 

CHF exacerbation. Last PFT: FVC is 1.02 liters, 34% predicted. The FEV1 is 0.57 

liters, 28% predicted. The FEV1/FVC ratio is 56. Severe COPD. Pt is on 5 mg 

Eliquis for atrial fibrillation. Admitted in May in [place] for pneumonia.


Onset of Symptoms: Reports: Gradual


Duration of Symptoms: Reports: Day(s): (1-2)


Worsens with: Reports: Movement


Context: Reports: Activity/Exercise


Associated Symptoms: Reports: Weakness





- Related Data


Allergies/Adverse Reactions: 


 Allergies











Allergy/AdvReac Type Severity Reaction Status Date / Time


 


No Known Allergies Allergy   Verified 08/08/18 15:48











Home Medications: 


 Home Meds





Ascorbic Acid [Vitamin C] 1,000 mg PO DAILY 08/04/17 [History]


Liraglutide [Victoza] 1.8 mg SUBCUT DAILY 08/04/17 [History]


Terbinafine [LamISIL AT 1% Crm] 1 applic TOP BID 08/04/17 [History]


Apixaban [Eliquis] 5 mg PO BID 05/15/18 [History]


Ergocalciferol (Vitamin D2) [Vitamin D2] 2,000 unit PO DAILY 05/15/18 [History]


Triamcinolone Acetonide [Kenalog 0.1% Crm] 1 applic TOP TID PRN 05/15/18 [

History]


Metoprolol Succinate [Toprol XL 50mg] 150 mg PO DAILY #90 tab.er 05/24/18 [Rx]


Arformoterol [Brovana] 15 mcg NEB BID 08/08/18 [History]


Budesonide [Pulmicort] 0.5 mg NEB BID 08/08/18 [History]


Ferrous Sulfate 325 mg PO DAILY 08/09/18 [History]


Cetirizine [ZyrTEC] 10 mg PO DAILY 08/30/19 [History]


Folic Acid 1 mg PO DAILY 08/30/19 [History]


Furosemide [Lasix] 40 mg PO 1600 08/30/19 [History]


Furosemide [Lasix] 80 mg PO DAILY 08/30/19 [History]


Hydrocodone/Acetaminophen [Hydrocodon-Acetaminophen 5-325] 1 tab PO BID PRN 08/ 30/19 [History]


Insuln Asp Prot/Insulin Aspart [NovoLOG Mix 70-30] 24 units SQ BIDMEALS 08/30/ 19 [History]


Ipratropium [Atrovent] 0.5 mg INH Q4HR PRN 08/30/19 [History]


predniSONE [Prednisone] 2.5 mg PO DAILY 08/30/19 [History]


sulfaSALAzine [Azulfidine] 1,000 mg PO BID 08/30/19 [History]











Past Medical History


HEENT History: Reports: None


Cardiovascular History: Reports: Afib, Heart Failure, High Cholesterol, 

Hypertension


Respiratory History: Reports: COPD


Other Respiratory History: Pt is on ome O2


Musculoskeletal History: Reports: None, Arthritis (rheumatoid), Osteoarthritis, 

Osteoporosis, RA


Endocrine/Metabolic History: Reports: Diabetes, Type II





- Infectious Disease History


Infectious Disease History: Reports: None





- Past Surgical History


Musculoskeletal Surgical History: Reports: None





Social & Family History





- Family History


Respiratory: Reports: None


GI: Reports: None


: Reports: None


Neurological: Reports: MS


Endocrine/Metabolic: Reports: Diabetes, type II





- Tobacco Use


Smoking Status *Q: Unknown Ever Smoked


Used Tobacco, but Quit: No


Second Hand Smoke Exposure: No





- Caffeine Use


Caffeine Use: Reports: Coffee





- Recreational Drug Use


Recreational Drug Use: No





H&P Review of Systems





- Review of Systems:


Review Of Systems: See Below


General: Reports: Fatigue


HEENT: Reports: Ear Pain (itches), Glasses


Pulmonary: Reports: Shortness of Breath, Wheezing


Cardiovascular: Reports: Edema


Gastrointestinal: Reports: No Symptoms


Genitourinary: Reports: Incontinence (some dribbling)


Musculoskeletal: Reports: No Symptoms


Skin: Reports: Dryness, Bruising (hands), Rash (right leg)


Psychiatric: Reports: No Symptoms


Neurological: Reports: Weakness


Hematologic/Lymphatic: Reports: Easy Bruising (takes Eliquis)


Immunologic: Reports: No Symptoms





Exam





- Exam


Exam: See Below





- Vital Signs


Vital Signs: 


 Last Vital Signs











Temp  98.1 F   08/30/19 13:30


 


Pulse  76   08/30/19 13:30


 


Resp  20   08/30/19 13:30


 


BP  101/68   08/30/19 13:30


 


Pulse Ox  90 L  08/30/19 13:30











Weight: 104.78 kg





- Exam


Quality Assessment: Supplemental Oxygen


General: Alert, Oriented, Cooperative, Mild Distress


HEENT: Conjunctiva Clear, Hearing Intact, Mucosa Moist & Pink, Nares Patent, 

PERRLA


Neck: Supple, Trachea Midline


Lungs: Decreased Breath Sounds (bilateral), Rhonchi (bilateral), Wheezing (insp 

/ exp), Other (use of accessory muscles for breathing)


Cardiovascular: Regular Rate, Irregular Rhythm


GI/Abdominal Exam: Normal Bowel Sounds, Non-Tender, Distended (mild)


Extremities: Normal Capillary Refill (< 3 sec), Pedal Edema (2+ both ankles)


Peripheral Pulses: 3+: Radial (L), Radial (R), Dorsalis Pedis (L), Dorsalis 

Pedis (R)


Skin: Warm, Dry, Rash (right lateral upper calf)


Neurological: Strength Equal Bilateral (bilateral upper extremities), Normal 

Tone


Neuro Extensive - Mental Status: Alert, Oriented x3, Normal Mood/Affect


Psychiatric: Alert, Normal Mood





- Patient Data


Lab Results Last 24 hrs: 


 Laboratory Results - last 24 hr











  08/30/19 08/30/19 08/30/19 Range/Units





  12:23 12:23 12:23 


 


WBC  5.8    (4.0-10.0)  x10^3/uL


 


RBC  3.59 L    (4.5-6.0)  x10^6/uL


 


Hgb  10.9 L    (14.0-18.0)  g/dL


 


Hct  36.8 L    (40.0-52.0)  %


 


MCV  102.5 H D    (78.0-93.0)  fL


 


MCH  30.4    (26.0-32.0)  pg


 


MCHC  29.6 L    (32.0-36.0)  g/dL


 


RDW Coeff of Brandon  13.7    (10.0-15.0)  %


 


Plt Count  200    (130-400)  x10^3/uL


 


Neut % (Auto)  75.4    (50.0-80.0)  %


 


Lymph % (Auto)  12.0 L    (25.0-50.0)  %


 


Mono % (Auto)  9.9    (2.0-11.0)  %


 


Eos % (Auto)  2.4    (0.0-4.0)  %


 


Baso % (Auto)  0.3    (0.2-1.2)  %


 


PT   10.6   (10.0-12.8)  SEC


 


INR   0.9 L   (2.0-3.5)  


 


POC ABG pH     (7.35-7.45)  


 


POC ABG pCO2     (35-45)  mmHG


 


POC ABG pO2     ()  mmHG


 


POC ABG HCO3     (22-26)  mmol/L


 


POC ABG Total CO2     (23-27)  mmol/L


 


POC ABG O2 Sat     (95-98)  %


 


POC ABG Base Excess     (-2-3)  mmol/L


 


POC FiO2     


 


Sodium    141  (136-145)  mmol/L


 


Potassium    3.5  (3.5-5.1)  mmol/L


 


Chloride    95 L  ()  mmol/L


 


Carbon Dioxide    43 H  (21-32)  mmol/L


 


Anion Gap    6.5 L  (10-20)  mmol/L


 


BUN    17  (7-18)  mg/dL


 


Creatinine    1.0  (0.70-1.30)  mg/dL


 


Est Cr Clr Drug Dosing    TNP  


 


Estimated GFR (MDRD)    > 60  


 


Glucose    107 H  ()  mg/dL


 


Lactic Acid     (0.4-2.0)  mmol/L


 


Calcium    9.1  (8.5-10.1)  mg/dL


 


Corrected Calcium    9.58  (8.5-10.1)  mg/dL


 


Phosphorus    3.3  (2.6-4.7)  mg/dL


 


Magnesium    1.6 L  (1.8-2.4)  mg/dL


 


Total Bilirubin    0.3  (0.2-1.0)  mg/dL


 


AST    15  (15-37)  U/L


 


ALT    21  (16-63)  U/L


 


Alkaline Phosphatase    94  ()  U/L


 


Troponin I    < 0.017  (<=0.056)  ng/mL


 


C-Reactive Protein    7.2 H  (<=0.9)  mg/dL


 


NT-Pro-B Natriuret Pep    3765 H  (<=450)  pg/mL


 


Total Protein    7.2  (6.4-8.2)  g/dL


 


Albumin    3.4  (3.4-5.0)  g/dL


 


Globulin    3.8  


 


Albumin/Globulin Ratio    0.89  


 


TSH, Ultra Sensitive    1.569  (0.358-3.74)  uIU/mL


 


POC Result Comm     














  08/30/19 08/30/19 Range/Units





  12:23 12:38 


 


WBC    (4.0-10.0)  x10^3/uL


 


RBC    (4.5-6.0)  x10^6/uL


 


Hgb    (14.0-18.0)  g/dL


 


Hct    (40.0-52.0)  %


 


MCV    (78.0-93.0)  fL


 


MCH    (26.0-32.0)  pg


 


MCHC    (32.0-36.0)  g/dL


 


RDW Coeff of Brandon    (10.0-15.0)  %


 


Plt Count    (130-400)  x10^3/uL


 


Neut % (Auto)    (50.0-80.0)  %


 


Lymph % (Auto)    (25.0-50.0)  %


 


Mono % (Auto)    (2.0-11.0)  %


 


Eos % (Auto)    (0.0-4.0)  %


 


Baso % (Auto)    (0.2-1.2)  %


 


PT    (10.0-12.8)  SEC


 


INR    (2.0-3.5)  


 


POC ABG pH   7.305 L  (7.35-7.45)  


 


POC ABG pCO2   88 H*  (35-45)  mmHG


 


POC ABG pO2   80  ()  mmHG


 


POC ABG HCO3   44 H  (22-26)  mmol/L


 


POC ABG Total CO2   46 H  (23-27)  mmol/L


 


POC ABG O2 Sat   93 L  (95-98)  %


 


POC ABG Base Excess   17 H  (-2-3)  mmol/L


 


POC FiO2   0.30  


 


Sodium    (136-145)  mmol/L


 


Potassium    (3.5-5.1)  mmol/L


 


Chloride    ()  mmol/L


 


Carbon Dioxide    (21-32)  mmol/L


 


Anion Gap    (10-20)  mmol/L


 


BUN    (7-18)  mg/dL


 


Creatinine    (0.70-1.30)  mg/dL


 


Est Cr Clr Drug Dosing    


 


Estimated GFR (MDRD)    


 


Glucose    ()  mg/dL


 


Lactic Acid  1.4   (0.4-2.0)  mmol/L


 


Calcium    (8.5-10.1)  mg/dL


 


Corrected Calcium    (8.5-10.1)  mg/dL


 


Phosphorus    (2.6-4.7)  mg/dL


 


Magnesium    (1.8-2.4)  mg/dL


 


Total Bilirubin    (0.2-1.0)  mg/dL


 


AST    (15-37)  U/L


 


ALT    (16-63)  U/L


 


Alkaline Phosphatase    ()  U/L


 


Troponin I    (<=0.056)  ng/mL


 


C-Reactive Protein    (<=0.9)  mg/dL


 


NT-Pro-B Natriuret Pep    (<=450)  pg/mL


 


Total Protein    (6.4-8.2)  g/dL


 


Albumin    (3.4-5.0)  g/dL


 


Globulin    


 


Albumin/Globulin Ratio    


 


TSH, Ultra Sensitive    (0.358-3.74)  uIU/mL


 


POC Result Comm   Called critical res  











Result Diagrams: 


 08/30/19 12:23





 08/30/19 12:23





EKG INTERPRETATION


EKG Date: 08/30/19


Rhythm: A-Fib


ST-T: Normal





- Problem List


(1) Atrial fibrillation


SNOMED Code(s): 94886534


   ICD Code: I48.91 - UNSPECIFIED ATRIAL FIBRILLATION   Status: Chronic   

Priority: Medium   Current Visit: Yes   


Qualifiers: 


   Atrial fibrillation type: paroxysmal   Qualified Code(s): I48.0 - Paroxysmal 

atrial fibrillation   





(2) CHF, acute on chronic


SNOMED Code(s): 466296618, 08869130625848


   ICD Code: I50.9 - HEART FAILURE, UNSPECIFIED   Status: Acute   Priority: 

High   Current Visit: Yes   


Qualifiers: 


   Heart failure type: combined systolic and diastolic   Qualified Code(s): 

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

congestive) heart failure   





(3) COPD (chronic obstructive pulmonary disease) with acute bronchitis


SNOMED Code(s): 138579849156374


   ICD Code: J44.0 - CHRONIC OBSTRUCTIVE PULMON DISEASE W ACUTE LOWER RESP 

INFCT   Status: Acute   Priority: High   Current Visit: No   Problem Details: 

with acute exacerbation    





(4) Diabetes


SNOMED Code(s): 51447858


   ICD Code: E11.9 - TYPE 2 DIABETES MELLITUS WITHOUT COMPLICATIONS   Status: 

Chronic   Priority: High   Current Visit: No   


Qualifiers: 


   Diabetes mellitus type: type 2   Diabetes mellitus long term insulin use: 

with long term use   Diabetes mellitus complication status: with hyperglycemia 

  Qualified Code(s): E11.65 - Type 2 diabetes mellitus with hyperglycemia; 

Z79.4 - Long term (current) use of insulin   


Problem List Initiated/Reviewed/Updated: Yes


Orders Last 24hrs: 


 Active Orders 24 hr











 Category Date Time Status


 


 Patient Status [ADT] Routine ADT  08/30/19 13:24 Active


 


 BIPAP Adult [RT BiPAP/CPAP] [RC] ASDIRECTED Care  08/30/19 15:36 Active


 


 Bladder Scan [RC] ASDIRECTED Care  08/30/19 15:36 Active


 


 Blood Glucose Check, Bedside [RC] WITHMEALSANDBED Care  08/30/19 15:31 Active


 


 Cardiac Monitoring [RC] 06,10,14,18,22,02 Care  08/30/19 11:54 Active


 


 Height and Weight [RC] DAILY Care  08/30/19 15:31 Active


 


 Intake and Output [RC] QSHIFT Care  08/30/19 15:32 Active


 


 Oxygen Therapy [RC] 08,20 Care  08/30/19 11:54 Active


 


 Oxygen Therapy [RC] PRN Care  08/30/19 15:31 Active


 


 RT Aerosol Therapy [RC] ASDIRECTED Care  08/30/19 15:33 Active


 


 RT Aerosol Therapy [RC] ASDIRECTED Care  08/30/19 15:36 Active


 


 Up With Assistance [RC] ASDIRECTED Care  08/30/19 15:31 Active


 


 VTE/DVT Education [RC] PER UNIT ROUTINE Care  08/30/19 15:31 Active


 


 Vital Signs [RC] Q4H Care  08/30/19 15:31 Active


 


 [employer]abetic [employer] Diet [DIET] Diet  08/30/19 Dinner Active


 


 BASIC METABOLIC PANEL,BMP [CHEM] AM Lab  08/31/19 05:15 Ordered


 


 CBC WITH AUTO DIFF [HEME] AM Lab  08/31/19 05:15 Ordered


 


 CULTURE BLOOD [BC] Stat Lab  08/30/19 12:13 Received


 


 CULTURE BLOOD [BC] Stat Lab  08/30/19 12:23 Received


 


 Acetaminophen/HYDROcodone [Norco 325-5 MG] Med  08/30/19 15:34 Ordered





 1 tab PO BID PRN   


 


 Albuterol/Ipratropium [DuoNeb 3.0-0.5 MG/3 ML] Med  08/30/19 15:31 Active





 3 ml NEB Q4H PRN   


 


 Albuterol/Ipratropium [DuoNeb 3.0-0.5 MG/3 ML] Med  08/30/19 19:00 Ordered





 3 ml NEB Q4HRRT   


 


 Apixaban [Eliquis] Med  08/30/19 20:00 Ordered





 5 mg PO BID   


 


 Arformoterol [Brovana] Med  08/30/19 20:00 Ordered





 15 mcg NEB BID   


 


 Cetirizine [ZyrTEC] Med  08/31/19 08:00 Ordered





 10 mg PO DAILY   


 


 Folic Acid Med  08/31/19 08:00 Ordered





 1 mg PO DAILY   


 


 Insuln Asp Prot/Insulin Aspart [NovoLOG Mix 70-30] Med  08/30/19 18:00 Ordered





 24 units SQ BIDMEALS   


 


 Metoprolol Succinate [Toprol XL] Med  08/31/19 08:00 Ordered





 150 mg PO DAILY   


 


 Potassium Chloride [Klor-Con 10] Med  08/30/19 15:33 Once





 20 meq PO ONETIME ONE   


 


 Sodium Chloride 0.9% [Saline Flush] Med  08/30/19 11:54 Active





 10 ml FLUSH ASDIRECTED PRN   


 


 methylPREDNISolone Sod Succ [Solu-MEDROL] Med  08/30/19 15:45 Ordered





 40 mg IVPUSH Q12H   


 


 sulfaSALAzine [Azulfidine] Med  08/30/19 20:00 Ordered





 1,000 mg PO BID   


 


 Blood Culture x2 Reflex Set [OM.PC] Stat Oth  08/30/19 11:55 Ordered


 


 Peripheral IV Insertion Adult [OM.PC] Routine Oth  08/30/19 11:55 Ordered


 


 Resuscitation Status Routine Resus Stat  08/30/19 15:31 Ordered








 Medication Orders





Hydrocodone Bitart/Acetaminophen (Norco 325-5 Mg)  1 tab PO BID PRN


   PRN Reason: Pain (moderate 4-6)


Albuterol/Ipratropium (Duoneb 3.0-0.5 Mg/3 Ml)  3 ml NEB Q4H PRN


   PRN Reason: dyspnea/wheezing


Albuterol/Ipratropium (Duoneb 3.0-0.5 Mg/3 Ml)  3 ml NEB Q4HRRT ARABELLA


Arformoterol Tartrate (Brovana)  15 mcg NEB BID ARABELLA


Folic Acid (Folic Acid)  1 mg PO DAILY ARABELLA


Methylprednisolone Sodium Succinate (Solu-Medrol)  40 mg IVPUSH Q12H ARABELLA


Metoprolol Succinate (Toprol Xl)  150 mg PO DAILY ARABELLA


Non-Formulary Medication (Apixaban [Eliquis])  5 mg PO BID ARABELLA


Non-Formulary Medication (Cetirizine [Zyrtec])  10 mg PO DAILY ARABELLA


Non-Formulary Medication (Insuln Asp Prot/Insulin Aspart [Novolog Mix 70-30])  

24 units SQ BIDMEALS ARABELLA


Non-Formulary Medication (Sulfasalazine [Azulfidine])  1,000 mg PO BID ARABELLA


Potassium Chloride (Klor-Con 10)  20 meq PO ONETIME ONE


   Stop: 08/30/19 15:34


Sodium Chloride (Saline Flush)  10 ml FLUSH ASDIRECTED PRN


   PRN Reason: Keep Vein Open








Assessment/Plan Comment:: 





1.  Acute on Chronic CHF exacerbation Ef 40 % back in 2018 had an echo in [place] 

last spring but EF never reported


2.  COPD exacerbation with known severe COPD


3.  Chronic a. fib


4.  Diabetes on insulin


5.  Weakness


6.  Acute hypercapnic respiratory failure due to COPD


7.  Chronic pain due to compression fractures and osteoporosis 


8.  HTN


9.  Mild confusion not that unexpected given CO2 retention and acute illness he 

has had issues with this during other hospital admits.  Supportive care for now








Plan:





60 mg of IV lasix given in ER this afternoon will likely re dose in the morning 

or sooner if needed he is on 80 mg in the Am and 40 at noon


Solumedrol 40 IV BID


Restart nebs long acting bronchodilator hold pulmicort while on IV steroids.  

Duo nebs scheduled and prn


Tele


Bipap due to increased work of breathing and CO2 retention no need to repeat 

ABG patient is palliative care we will not plan to intubate or transfer just 

focus on comfort and breathing


DVT proph he is on Eliqus


No ABX for now, repeat labs in AM\


CXR looks more fluid


EKG a. fib 


Esquivel if retention








- Mortality Measure


Prognosis:: Poor

## 2019-08-30 NOTE — CR
______________________________________________________________________________   

  

[id number] RAD/RAD Chest PA or AP 1V  

EXAM:  RAD Chest PA or AP 1V  

   

 INDICATION:  DYSPNEA.  

   

 COMPARISON:  July 31, 2019.  

   

 DISCUSSION:    

   

 Cardiomegaly and central vascular congestion with pulmonary edema and small  

 effusions. Findings are most consistent with acute CHF exacerbation.   

   

 Findings are superimposed on changes of COPD.  

   

 IMPRESSION:  

 As above.  

   

 Electronically signed by Moncho Rdz MD on 8/30/2019 12:59 PM  

   

  

Moncho Rdz MD                 

 08/30/19 [id number]    

  

Thank you for allowing us to participate in the care of your patient.

## 2019-08-30 NOTE — EDM.PDOC
ED HPI GENERAL MEDICAL PROBLEM





- General


Chief Complaint: Respiratory Problem


Stated Complaint: SHORT OF BREATH


Time Seen by Provider: 08/30/19 11:45


Source of Information: Reports: Patient, Family


History Limitations: Reports: No Limitations





- History of Present Illness


INITIAL COMMENTS - FREE TEXT/NARRATIVE: 





Pt. presents to ER with complaints of shortness of breath over the past 24-48 

hours. Pt. has a history of CHF (diastolic) as well as COPD. He is on home O2 

at 2.5-3L/min. Wife states that he was quite edematous yesterday and was having 

trouble getting his pants on due to swelling in his lower extremities. He 

denies any chest pain. No nausea, vomiting. He has no productive cough. Denies 

any fever or chills.


Pt. appears to be anticoagulated with Eliquis for atrial fibrillation. Pt. 

currently takes lasix 80mg in the AM and 40mg at noon for his CHF. Pt. was 

hospitalized for acute CHF exacerbation approx. 1 year ago. Nuclear stress test 

in 6/18 showed ischemic cardiomyopathy.


Pt. denies any palpitations. No lightheadedness. 





Last PFT: FVC is 1.02 liters, 34% predicted. The FEV1 is 0.57 liters, 28% 

predicted. The FEV1/FVC ratio is 56. Severe COPD.








Onset: Today


Onset Date: 08/30/19


Location: Reports: Chest, Generalized


Associated Symptoms: Reports: Confusion, Shortness of Breath.  Denies: Chest 

Pain, Diaphoresis, Fever/Chills, Syncope, Weakness





- Related Data


 Allergies











Allergy/AdvReac Type Severity Reaction Status Date / Time


 


No Known Allergies Allergy   Verified 08/08/18 15:48











Home Meds: 


 Home Meds





Ascorbic Acid [Vitamin C] 1,000 mg PO DAILY 08/04/17 [History]


Liraglutide [Victoza] 1.8 mg SUBCUT DAILY 08/04/17 [History]


Pravastatin Sodium [Pravachol] 40 mg PO DAILY 08/04/17 [History]


Terbinafine [LamISIL AT 1% Crm] 1 applic TOP BID 08/04/17 [History]


Albuterol/Ipratropium [DuoNeb 3.0-0.5 MG/3 ML] 3 ml NEB QID 05/15/18 [History]


Apixaban [Eliquis] 5 mg PO BID 05/15/18 [History]


Ergocalciferol (Vitamin D2) [Vitamin D2] 2,000 unit PO DAILY 05/15/18 [History]


Levocetirizine Dihydrochloride [Xyzal] 5 mg PO BEDTIME 05/15/18 [History]


Triamcinolone Acetonide [Kenalog 0.1% Crm] 1 applic TOP TID PRN 05/15/18 [

History]


hydrOXYzine pamoate [Vistaril] 25 mg PO TID PRN 05/15/18 [History]


Magnesium Chloride [Mag-64] 128 mg PO BID #60 tab.er 05/24/18 [Rx]


Metoprolol Succinate [Toprol XL 50mg] 150 mg PO DAILY #90 tab.er 05/24/18 [Rx]


QUEtiapine [SEROquel] 12.5 mg PO BEDTIME PRN #30 tablet 05/24/18 [Rx]


Arformoterol [Brovana] 15 mcg NEB BID 08/08/18 [History]


Budesonide [Pulmicort] 0.5 mg NEB BID 08/08/18 [History]


Silver Sulfadiazine [Silvadene 1% Cream 50 GM] 50 gm TOP BID 08/08/18 [History]


Ferrous Sulfate 325 mg PO DAILY 08/09/18 [History]


Doxycycline [Vibramycin] 100 mg PO BID #6 cap 08/10/18 [Rx]


Furosemide [Lasix] 40 mg PO BID #0 08/10/18 [Rx]











Past Medical History


Cardiovascular History: Reports: Afib, Heart Failure, High Cholesterol, 

Hypertension


Respiratory History: Reports: COPD


Other Respiratory History: Pt is on ome O2


Endocrine/Metabolic History: Reports: Diabetes, Type II





Social & Family History





- Caffeine Use


Caffeine Use: Reports: Coffee





ED ROS GENERAL





- Review of Systems


Review Of Systems: See Below


Constitutional: Reports: Malaise, Weakness (global weakness), Fatigue, Weight 

Gain.  Denies: Fever, Chills, Night Sweats, Diaphoresis


HEENT: Reports: No Symptoms


Respiratory: Reports: Shortness of Breath


Cardiovascular: Reports: No Symptoms


Endocrine: Reports: No Symptoms


GI/Abdominal: Reports: No Symptoms


: Reports: No Symptoms


Musculoskeletal: Reports: No Symptoms


Skin: Reports: Pallor


Neurological: Reports: No Symptoms


Psychiatric: Reports: No Symptoms


Hematologic/Lymphatic: Reports: No Symptoms


Immunologic: Reports: No Symptoms





ED EXAM, GENERAL





- Physical Exam


Exam: See Below


Exam Limited By: No Limitations


General Appearance: Alert, WD/WN, No Apparent Distress


Nose: Normal Inspection, Normal Mucosa, No Blood


Throat/Mouth: Normal Inspection, Normal Lips, Normal Teeth, Normal Gums, Normal 

Oropharynx, Normal Voice, No Airway Compromise.  No: Perioral Cyanosis


Head: Atraumatic, Normocephalic


Neck: Normal Inspection, Supple, Non-Tender, Full Range of Motion


Respiratory/Chest: Respiratory Distress (Initially was mildly distressed, 

improved with IV lasix.), Crackles, Rales


Cardiovascular: Normal Peripheral Pulses, Irregularly Irregular, Other (

significant edema to lower extremities)


Peripheral Pulses: 3+: Radial (R)


GI/Abdominal: Soft, Non-Tender, No Organomegaly, No Mass, Distended


 (Male) Exam: Deferred


Rectal (Males) Exam: Deferred


Back Exam: Normal Inspection, Full Range of Motion


Extremities: Non-Tender, Normal Capillary Refill, Pedal Edema


Neurological: Alert, Oriented, CN II-XII Intact, No Motor/Sensory Deficits, 

Confused


Psychiatric: Normal Affect, Normal Mood


Skin Exam: Warm, Dry, Intact, No Rash, Pallor


Lymphatic: No Adenopathy





EKG INTERPRETATION


Rhythm: A-Fib





Course





- Vital Signs


Last Recorded V/S: 


 Last Vital Signs











Temp  36.2 C   08/30/19 11:43


 


Pulse  72   08/30/19 11:43


 


Resp  20   08/30/19 11:43


 


BP  102/69   08/30/19 11:43


 


Pulse Ox  90 L  08/30/19 11:43














- Orders/Labs/Meds


Orders: 


 Active Orders 24 hr











 Category Date Time Status


 


 Cardiac Monitoring [RC] CONTINUOUS Care  08/30/19 11:54 Active


 


 EKG Documentation Completion [RC] STAT Care  08/30/19 11:54 Active


 


 Oxygen Therapy [RC] PRN Care  08/30/19 11:54 Active


 


 CULTURE BLOOD [BC] Stat Lab  08/30/19 12:13 Received


 


 CULTURE BLOOD [BC] Stat Lab  08/30/19 12:23 Received


 


 Sodium Chloride 0.9% [Saline Flush] Med  08/30/19 11:54 Active





 10 ml FLUSH ASDIRECTED PRN   


 


 Blood Culture x2 Reflex Set [OM.PC] Stat Oth  08/30/19 11:55 Ordered


 


 Peripheral IV Insertion Adult [OM.PC] Routine Oth  08/30/19 11:55 Ordered








 Medication Orders





Sodium Chloride (Saline Flush)  10 ml FLUSH ASDIRECTED PRN


   PRN Reason: Keep Vein Open








Labs: 


 Laboratory Tests











  08/30/19 08/30/19 08/30/19 Range/Units





  12:23 12:23 12:23 


 


WBC  5.8    (4.0-10.0)  x10^3/uL


 


RBC  3.59 L    (4.5-6.0)  x10^6/uL


 


Hgb  10.9 L    (14.0-18.0)  g/dL


 


Hct  36.8 L    (40.0-52.0)  %


 


MCV  102.5 H D    (78.0-93.0)  fL


 


MCH  30.4    (26.0-32.0)  pg


 


MCHC  29.6 L    (32.0-36.0)  g/dL


 


RDW Coeff of Brandon  13.7    (10.0-15.0)  %


 


Plt Count  200    (130-400)  x10^3/uL


 


Neut % (Auto)  75.4    (50.0-80.0)  %


 


Lymph % (Auto)  12.0 L    (25.0-50.0)  %


 


Mono % (Auto)  9.9    (2.0-11.0)  %


 


Eos % (Auto)  2.4    (0.0-4.0)  %


 


Baso % (Auto)  0.3    (0.2-1.2)  %


 


PT   10.6   (10.0-12.8)  SEC


 


INR   0.9 L   (2.0-3.5)  


 


POC ABG pH     (7.35-7.45)  


 


POC ABG pCO2     (35-45)  mmHG


 


POC ABG pO2     ()  mmHG


 


POC ABG HCO3     (22-26)  mmol/L


 


POC ABG Total CO2     (23-27)  mmol/L


 


POC ABG O2 Sat     (95-98)  %


 


POC ABG Base Excess     (-2-3)  mmol/L


 


POC FiO2     


 


Sodium    141  (136-145)  mmol/L


 


Potassium    3.5  (3.5-5.1)  mmol/L


 


Chloride    95 L  ()  mmol/L


 


Carbon Dioxide    43 H  (21-32)  mmol/L


 


Anion Gap    6.5 L  (10-20)  mmol/L


 


BUN    17  (7-18)  mg/dL


 


Creatinine    1.0  (0.70-1.30)  mg/dL


 


Est Cr Clr Drug Dosing    TNP  


 


Estimated GFR (MDRD)    > 60  


 


Glucose    107 H  ()  mg/dL


 


Lactic Acid     (0.4-2.0)  mmol/L


 


Calcium    9.1  (8.5-10.1)  mg/dL


 


Corrected Calcium    9.58  (8.5-10.1)  mg/dL


 


Phosphorus    3.3  (2.6-4.7)  mg/dL


 


Magnesium    1.6 L  (1.8-2.4)  mg/dL


 


Total Bilirubin    0.3  (0.2-1.0)  mg/dL


 


AST    15  (15-37)  U/L


 


ALT    21  (16-63)  U/L


 


Alkaline Phosphatase    94  ()  U/L


 


Troponin I    < 0.017  (<=0.056)  ng/mL


 


C-Reactive Protein    7.2 H  (<=0.9)  mg/dL


 


NT-Pro-B Natriuret Pep    3765 H  (<=450)  pg/mL


 


Total Protein    7.2  (6.4-8.2)  g/dL


 


Albumin    3.4  (3.4-5.0)  g/dL


 


Globulin    3.8  


 


Albumin/Globulin Ratio    0.89  


 


TSH, Ultra Sensitive    1.569  (0.358-3.74)  uIU/mL


 


POC Result Comm     














  08/30/19 08/30/19 Range/Units





  12:23 12:38 


 


WBC    (4.0-10.0)  x10^3/uL


 


RBC    (4.5-6.0)  x10^6/uL


 


Hgb    (14.0-18.0)  g/dL


 


Hct    (40.0-52.0)  %


 


MCV    (78.0-93.0)  fL


 


MCH    (26.0-32.0)  pg


 


MCHC    (32.0-36.0)  g/dL


 


RDW Coeff of Brandon    (10.0-15.0)  %


 


Plt Count    (130-400)  x10^3/uL


 


Neut % (Auto)    (50.0-80.0)  %


 


Lymph % (Auto)    (25.0-50.0)  %


 


Mono % (Auto)    (2.0-11.0)  %


 


Eos % (Auto)    (0.0-4.0)  %


 


Baso % (Auto)    (0.2-1.2)  %


 


PT    (10.0-12.8)  SEC


 


INR    (2.0-3.5)  


 


POC ABG pH   7.305 L  (7.35-7.45)  


 


POC ABG pCO2   88 H*  (35-45)  mmHG


 


POC ABG pO2   80  ()  mmHG


 


POC ABG HCO3   44 H  (22-26)  mmol/L


 


POC ABG Total CO2   46 H  (23-27)  mmol/L


 


POC ABG O2 Sat   93 L  (95-98)  %


 


POC ABG Base Excess   17 H  (-2-3)  mmol/L


 


POC FiO2   0.30  


 


Sodium    (136-145)  mmol/L


 


Potassium    (3.5-5.1)  mmol/L


 


Chloride    ()  mmol/L


 


Carbon Dioxide    (21-32)  mmol/L


 


Anion Gap    (10-20)  mmol/L


 


BUN    (7-18)  mg/dL


 


Creatinine    (0.70-1.30)  mg/dL


 


Est Cr Clr Drug Dosing    


 


Estimated GFR (MDRD)    


 


Glucose    ()  mg/dL


 


Lactic Acid  1.4   (0.4-2.0)  mmol/L


 


Calcium    (8.5-10.1)  mg/dL


 


Corrected Calcium    (8.5-10.1)  mg/dL


 


Phosphorus    (2.6-4.7)  mg/dL


 


Magnesium    (1.8-2.4)  mg/dL


 


Total Bilirubin    (0.2-1.0)  mg/dL


 


AST    (15-37)  U/L


 


ALT    (16-63)  U/L


 


Alkaline Phosphatase    ()  U/L


 


Troponin I    (<=0.056)  ng/mL


 


C-Reactive Protein    (<=0.9)  mg/dL


 


NT-Pro-B Natriuret Pep    (<=450)  pg/mL


 


Total Protein    (6.4-8.2)  g/dL


 


Albumin    (3.4-5.0)  g/dL


 


Globulin    


 


Albumin/Globulin Ratio    


 


TSH, Ultra Sensitive    (0.358-3.74)  uIU/mL


 


POC Result Comm   Called critical res  











Meds: 


Medications











Generic Name Dose Route Start Last Admin





  Trade Name Freq  PRN Reason Stop Dose Admin


 


Sodium Chloride  10 ml  08/30/19 11:54  





  Saline Flush  FLUSH   





  ASDIRECTED PRN   





  Keep Vein Open   





     





     





     














Discontinued Medications














Generic Name Dose Route Start Last Admin





  Trade Name Freq  PRN Reason Stop Dose Admin


 


Furosemide  60 mg  08/30/19 11:56  08/30/19 12:17





  Lasix  PO  08/30/19 11:57  Not Given





  ONETIME ONE   





     





     





     





     


 


Furosemide  60 mg  08/30/19 12:15  08/30/19 12:19





  Lasix  IV  08/30/19 12:16  60 mg





  ONETIME ONE   Administration





     





     





     





     














- Radiology Interpretation


Free Text/Narrative:: 





Cardiomegaly, central vascular congestion and pulmonary edema with small 

pleural effusions. No obvious infiltrate.





- Re-Assessments/Exams


Free Text/Narrative Re-Assessment/Exam: 


Pt. was given lasix 60mg IV on arrival to ER. Pt. reported significant 

improvement in dyspnea. He was maintaining O2 saturations of greater than 90% 

on 3L/min.





Departure





- Departure


Time of Disposition: 13:30


Disposition: DC/Tfer to [facility]


Clinical Impression: 


 Hypoxemia, CHF, Congestive heart failure








- Discharge Information





- My Orders


Last 24 Hours: 


My Active Orders





08/30/19 11:54


Cardiac Monitoring [RC] CONTINUOUS 


EKG Documentation Completion [RC] STAT 


Oxygen Therapy [RC] PRN 


Sodium Chloride 0.9% [Saline Flush]   10 ml FLUSH ASDIRECTED PRN 





08/30/19 11:55


Blood Culture x2 Reflex Set [OM.PC] Stat 


Peripheral IV Insertion Adult [OM.PC] Routine 





08/30/19 12:13


CULTURE BLOOD [BC] Stat 





08/30/19 12:23


CULTURE BLOOD [BC] Stat 














- Assessment/Plan


Last 24 Hours: 


My Active Orders





08/30/19 11:54


Cardiac Monitoring [RC] CONTINUOUS 


EKG Documentation Completion [RC] STAT 


Oxygen Therapy [RC] PRN 


Sodium Chloride 0.9% [Saline Flush]   10 ml FLUSH ASDIRECTED PRN 





08/30/19 11:55


Blood Culture x2 Reflex Set [OM.PC] Stat 


Peripheral IV Insertion Adult [OM.PC] Routine 





08/30/19 12:13


CULTURE BLOOD [BC] Stat 





08/30/19 12:23


CULTURE BLOOD [BC] Stat

## 2019-08-31 NOTE — PCM.PN
- General Info


Date of Service: 08/31/19


Subjective Update: 





85 yo male hospital day #2 admitted with CHF and COPD exacerbation.





He states he feels the same as yesterday but then goes on to say that he does 

not remember much from yesterday and also feels today like he usually feels. He 

is short of breath and coughing but he feels this is at his usual. No sputum 

production. No fever. He is eating well. He denies any other questions or 

concerns.





- Review of Systems


General: Reports: No Symptoms


HEENT: Reports: No Symptoms


Pulmonary: Reports: Shortness of Breath, Cough


Cardiovascular: Reports: No Symptoms


Gastrointestinal: Reports: No Symptoms


Genitourinary: Reports: No Symptoms


Musculoskeletal: Reports: No Symptoms


Skin: Reports: No Symptoms


Neurological: Reports: No Symptoms





- Patient Data


Vitals - Most Recent: 


 Last Vital Signs











Temp  36.8 C   08/31/19 05:35


 


Pulse  125 H  08/31/19 07:34


 


Resp  20   08/31/19 05:35


 


BP  132/72   08/31/19 07:34


 


Pulse Ox  95   08/31/19 08:00











Weight - Most Recent: 108.182 kg


I&O - Last 24 Hours: 


 Intake & Output











 08/30/19 08/31/19 08/31/19





 22:59 06:59 14:59


 


Intake Total 800 200 


 


Output Total  300 


 


Balance 800 -100 











Lab Results Last 24 Hours: 


 Laboratory Results - last 24 hr











  08/30/19 08/30/19 08/30/19 Range/Units





  12:23 12:23 12:23 


 


WBC  5.8    (4.0-10.0)  x10^3/uL


 


RBC  3.59 L    (4.5-6.0)  x10^6/uL


 


Hgb  10.9 L    (14.0-18.0)  g/dL


 


Hct  36.8 L    (40.0-52.0)  %


 


MCV  102.5 H D    (78.0-93.0)  fL


 


MCH  30.4    (26.0-32.0)  pg


 


MCHC  29.6 L    (32.0-36.0)  g/dL


 


RDW Coeff of Brandon  13.7    (10.0-15.0)  %


 


Plt Count  200    (130-400)  x10^3/uL


 


Neut % (Auto)  75.4    (50.0-80.0)  %


 


Lymph % (Auto)  12.0 L    (25.0-50.0)  %


 


Mono % (Auto)  9.9    (2.0-11.0)  %


 


Eos % (Auto)  2.4    (0.0-4.0)  %


 


Baso % (Auto)  0.3    (0.2-1.2)  %


 


PT   10.6   (10.0-12.8)  SEC


 


INR   0.9 L   (2.0-3.5)  


 


POC ABG pH     (7.35-7.45)  


 


POC ABG pCO2     (35-45)  mmHG


 


POC ABG pO2     ()  mmHG


 


POC ABG HCO3     (22-26)  mmol/L


 


POC ABG Total CO2     (23-27)  mmol/L


 


POC ABG O2 Sat     (95-98)  %


 


POC ABG Base Excess     (-2-3)  mmol/L


 


POC FiO2     


 


Sodium    141  (136-145)  mmol/L


 


Potassium    3.5  (3.5-5.1)  mmol/L


 


Chloride    95 L  ()  mmol/L


 


Carbon Dioxide    43 H  (21-32)  mmol/L


 


Anion Gap    6.5 L  (10-20)  mmol/L


 


BUN    17  (7-18)  mg/dL


 


Creatinine    1.0  (0.70-1.30)  mg/dL


 


Est Cr Clr Drug Dosing    TNP  


 


Estimated GFR (MDRD)    > 60  


 


Glucose    107 H  ()  mg/dL


 


POC Glucose     ()  mg/dL


 


Lactic Acid     (0.4-2.0)  mmol/L


 


Calcium    9.1  (8.5-10.1)  mg/dL


 


Corrected Calcium    9.58  (8.5-10.1)  mg/dL


 


Phosphorus    3.3  (2.6-4.7)  mg/dL


 


Magnesium    1.6 L  (1.8-2.4)  mg/dL


 


Total Bilirubin    0.3  (0.2-1.0)  mg/dL


 


AST    15  (15-37)  U/L


 


ALT    21  (16-63)  U/L


 


Alkaline Phosphatase    94  ()  U/L


 


Troponin I    < 0.017  (<=0.056)  ng/mL


 


C-Reactive Protein    7.2 H  (<=0.9)  mg/dL


 


NT-Pro-B Natriuret Pep    3765 H  (<=450)  pg/mL


 


Total Protein    7.2  (6.4-8.2)  g/dL


 


Albumin    3.4  (3.4-5.0)  g/dL


 


Globulin    3.8  


 


Albumin/Globulin Ratio    0.89  


 


TSH, Ultra Sensitive    1.569  (0.358-3.74)  uIU/mL


 


POC Result Comm     














  08/30/19 08/30/19 08/30/19 Range/Units





  12:23 12:38 17:41 


 


WBC     (4.0-10.0)  x10^3/uL


 


RBC     (4.5-6.0)  x10^6/uL


 


Hgb     (14.0-18.0)  g/dL


 


Hct     (40.0-52.0)  %


 


MCV     (78.0-93.0)  fL


 


MCH     (26.0-32.0)  pg


 


MCHC     (32.0-36.0)  g/dL


 


RDW Coeff of Brandon     (10.0-15.0)  %


 


Plt Count     (130-400)  x10^3/uL


 


Neut % (Auto)     (50.0-80.0)  %


 


Lymph % (Auto)     (25.0-50.0)  %


 


Mono % (Auto)     (2.0-11.0)  %


 


Eos % (Auto)     (0.0-4.0)  %


 


Baso % (Auto)     (0.2-1.2)  %


 


PT     (10.0-12.8)  SEC


 


INR     (2.0-3.5)  


 


POC ABG pH   7.305 L   (7.35-7.45)  


 


POC ABG pCO2   88 H*   (35-45)  mmHG


 


POC ABG pO2   80   ()  mmHG


 


POC ABG HCO3   44 H   (22-26)  mmol/L


 


POC ABG Total CO2   46 H   (23-27)  mmol/L


 


POC ABG O2 Sat   93 L   (95-98)  %


 


POC ABG Base Excess   17 H   (-2-3)  mmol/L


 


POC FiO2   0.30   


 


Sodium     (136-145)  mmol/L


 


Potassium     (3.5-5.1)  mmol/L


 


Chloride     ()  mmol/L


 


Carbon Dioxide     (21-32)  mmol/L


 


Anion Gap     (10-20)  mmol/L


 


BUN     (7-18)  mg/dL


 


Creatinine     (0.70-1.30)  mg/dL


 


Est Cr Clr Drug Dosing     


 


Estimated GFR (MDRD)     


 


Glucose     ()  mg/dL


 


POC Glucose    35 L*  ()  mg/dL


 


Lactic Acid  1.4    (0.4-2.0)  mmol/L


 


Calcium     (8.5-10.1)  mg/dL


 


Corrected Calcium     (8.5-10.1)  mg/dL


 


Phosphorus     (2.6-4.7)  mg/dL


 


Magnesium     (1.8-2.4)  mg/dL


 


Total Bilirubin     (0.2-1.0)  mg/dL


 


AST     (15-37)  U/L


 


ALT     (16-63)  U/L


 


Alkaline Phosphatase     ()  U/L


 


Troponin I     (<=0.056)  ng/mL


 


C-Reactive Protein     (<=0.9)  mg/dL


 


NT-Pro-B Natriuret Pep     (<=450)  pg/mL


 


Total Protein     (6.4-8.2)  g/dL


 


Albumin     (3.4-5.0)  g/dL


 


Globulin     


 


Albumin/Globulin Ratio     


 


TSH, Ultra Sensitive     (0.358-3.74)  uIU/mL


 


POC Result Comm   Called critical res   














  08/30/19 08/30/19 08/31/19 Range/Units





  17:56 19:24 06:02 


 


WBC     (4.0-10.0)  x10^3/uL


 


RBC     (4.5-6.0)  x10^6/uL


 


Hgb     (14.0-18.0)  g/dL


 


Hct     (40.0-52.0)  %


 


MCV     (78.0-93.0)  fL


 


MCH     (26.0-32.0)  pg


 


MCHC     (32.0-36.0)  g/dL


 


RDW Coeff of Brandon     (10.0-15.0)  %


 


Plt Count     (130-400)  x10^3/uL


 


Neut % (Auto)     (50.0-80.0)  %


 


Lymph % (Auto)     (25.0-50.0)  %


 


Mono % (Auto)     (2.0-11.0)  %


 


Eos % (Auto)     (0.0-4.0)  %


 


Baso % (Auto)     (0.2-1.2)  %


 


PT     (10.0-12.8)  SEC


 


INR     (2.0-3.5)  


 


POC ABG pH     (7.35-7.45)  


 


POC ABG pCO2     (35-45)  mmHG


 


POC ABG pO2     ()  mmHG


 


POC ABG HCO3     (22-26)  mmol/L


 


POC ABG Total CO2     (23-27)  mmol/L


 


POC ABG O2 Sat     (95-98)  %


 


POC ABG Base Excess     (-2-3)  mmol/L


 


POC FiO2     


 


Sodium     (136-145)  mmol/L


 


Potassium     (3.5-5.1)  mmol/L


 


Chloride     ()  mmol/L


 


Carbon Dioxide     (21-32)  mmol/L


 


Anion Gap     (10-20)  mmol/L


 


BUN     (7-18)  mg/dL


 


Creatinine     (0.70-1.30)  mg/dL


 


Est Cr Clr Drug Dosing     


 


Estimated GFR (MDRD)     


 


Glucose     ()  mg/dL


 


POC Glucose  48 L  199 H  134 H  ()  mg/dL


 


Lactic Acid     (0.4-2.0)  mmol/L


 


Calcium     (8.5-10.1)  mg/dL


 


Corrected Calcium     (8.5-10.1)  mg/dL


 


Phosphorus     (2.6-4.7)  mg/dL


 


Magnesium     (1.8-2.4)  mg/dL


 


Total Bilirubin     (0.2-1.0)  mg/dL


 


AST     (15-37)  U/L


 


ALT     (16-63)  U/L


 


Alkaline Phosphatase     ()  U/L


 


Troponin I     (<=0.056)  ng/mL


 


C-Reactive Protein     (<=0.9)  mg/dL


 


NT-Pro-B Natriuret Pep     (<=450)  pg/mL


 


Total Protein     (6.4-8.2)  g/dL


 


Albumin     (3.4-5.0)  g/dL


 


Globulin     


 


Albumin/Globulin Ratio     


 


TSH, Ultra Sensitive     (0.358-3.74)  uIU/mL


 


POC Result Comm     














  08/31/19 08/31/19 Range/Units





  07:50 07:50 


 


WBC  4.5   (4.0-10.0)  x10^3/uL


 


RBC  3.71 L   (4.5-6.0)  x10^6/uL


 


Hgb  11.3 L   (14.0-18.0)  g/dL


 


Hct  37.4 L   (40.0-52.0)  %


 


MCV  100.8 H   (78.0-93.0)  fL


 


MCH  30.5   (26.0-32.0)  pg


 


MCHC  30.2 L   (32.0-36.0)  g/dL


 


RDW Coeff of Brandon  13.5   (10.0-15.0)  %


 


Plt Count  181   (130-400)  x10^3/uL


 


Neut % (Auto)  85.5 H   (50.0-80.0)  %


 


Lymph % (Auto)  10.5 L   (25.0-50.0)  %


 


Mono % (Auto)  3.4   (2.0-11.0)  %


 


Eos % (Auto)  0.4   (0.0-4.0)  %


 


Baso % (Auto)  0.2   (0.2-1.2)  %


 


PT    (10.0-12.8)  SEC


 


INR    (2.0-3.5)  


 


POC ABG pH    (7.35-7.45)  


 


POC ABG pCO2    (35-45)  mmHG


 


POC ABG pO2    ()  mmHG


 


POC ABG HCO3    (22-26)  mmol/L


 


POC ABG Total CO2    (23-27)  mmol/L


 


POC ABG O2 Sat    (95-98)  %


 


POC ABG Base Excess    (-2-3)  mmol/L


 


POC FiO2    


 


Sodium   139  (136-145)  mmol/L


 


Potassium   5.1  D  (3.5-5.1)  mmol/L


 


Chloride   94 L  ()  mmol/L


 


Carbon Dioxide   44 H  (21-32)  mmol/L


 


Anion Gap   6.1 L  (10-20)  mmol/L


 


BUN   22 H  (7-18)  mg/dL


 


Creatinine   1.0  (0.70-1.30)  mg/dL


 


Est Cr Clr Drug Dosing   47.83  


 


Estimated GFR (MDRD)   > 60  


 


Glucose   188 H  ()  mg/dL


 


POC Glucose    ()  mg/dL


 


Lactic Acid    (0.4-2.0)  mmol/L


 


Calcium   9.3  (8.5-10.1)  mg/dL


 


Corrected Calcium    (8.5-10.1)  mg/dL


 


Phosphorus    (2.6-4.7)  mg/dL


 


Magnesium    (1.8-2.4)  mg/dL


 


Total Bilirubin    (0.2-1.0)  mg/dL


 


AST    (15-37)  U/L


 


ALT    (16-63)  U/L


 


Alkaline Phosphatase    ()  U/L


 


Troponin I    (<=0.056)  ng/mL


 


C-Reactive Protein    (<=0.9)  mg/dL


 


NT-Pro-B Natriuret Pep    (<=450)  pg/mL


 


Total Protein    (6.4-8.2)  g/dL


 


Albumin    (3.4-5.0)  g/dL


 


Globulin    


 


Albumin/Globulin Ratio    


 


TSH, Ultra Sensitive    (0.358-3.74)  uIU/mL


 


POC Result Comm    











Med Orders - Current: 


 Current Medications





Hydrocodone Bitart/Acetaminophen (Norco 325-5 Mg)  1 tab PO BID PRN


   PRN Reason: Pain (moderate 4-6)


   Last Admin: 08/30/19 16:30 Dose:  1 tab


Albuterol/Ipratropium (Duoneb 3.0-0.5 Mg/3 Ml)  3 ml NEB Q4H PRN


   PRN Reason: dyspnea/wheezing


Albuterol/Ipratropium (Duoneb 3.0-0.5 Mg/3 Ml)  3 ml NEB Q4HRRT [facility]


   Last Admin: 08/31/19 06:06 Dose:  3 ml


Apixaban (Eliquis)  5 mg PO BID [facility]


   Last Admin: 08/31/19 07:34 Dose:  5 mg


Arformoterol Tartrate (Brovana)  15 mcg NEB BID [facility]


   Last Admin: 08/31/19 07:35 Dose:  15 mcg


Folic Acid (Folic Acid)  1 mg PO DAILY [facility]


   Last Admin: 08/31/19 07:35 Dose:  1 mg


Furosemide (Lasix)  60 mg IV ONETIME ONE


   Stop: 08/31/19 09:49


Insulin Lispro Protam/Lispro Human (Humalog Mix 75-25)  24 unit SUBCUT BIDMEALS 

[facility]


   Last Admin: 08/31/19 07:36 Dose:  24 unit


Loratadine (Claritin)  10 mg PO DAILY [facility]


   Last Admin: 08/31/19 07:34 Dose:  10 mg


Metoprolol Succinate (Toprol Xl)  150 mg PO DAILY [facility]


   Last Admin: 08/31/19 07:34 Dose:  150 mg


Non-Formulary Medication (Sulfasalazine [Azulfidine])  1,000 mg PO BID [facility]


Prednisone (Prednisone)  40 mg PO WITHBREAKFAST [facility]


Sodium Chloride (Saline Flush)  10 ml FLUSH ASDIRECTED PRN


   PRN Reason: Keep Vein Open





Discontinued Medications





Furosemide (Lasix)  60 mg PO ONETIME ONE


   Stop: 08/30/19 11:57


   Last Admin: 08/30/19 12:17 Dose:  Not Given


Furosemide (Lasix)  60 mg IV ONETIME ONE


   Stop: 08/30/19 12:16


   Last Admin: 08/30/19 12:19 Dose:  60 mg


Methylprednisolone Sodium Succinate (Solu-Medrol)  40 mg IVPUSH Q12H [facility]


   Last Admin: 08/31/19 03:08 Dose:  40 mg


Potassium Chloride (Klor-Con 10)  20 meq PO ONETIME ONE


   Stop: 08/30/19 15:34


   Last Admin: 08/30/19 16:30 Dose:  20 meq











- Exam


General: Alert, Cooperative, No Acute Distress


HEENT: Mucous Membr. Moist/Pink


Neck: Supple, Trachea Midline, No Thyromegaly.  No: Lymphadenopathy


Lungs: Normal Respiratory Effort, Rhonchi (scattered bilaterally), Wheezing (

scattered bilaterally)


Cardiovascular: No Murmurs, Irregular Rhythm, Tachycardia


GI/Abdominal Exam: Normal Bowel Sounds, Soft, Non-Tender, No Organomegaly, No 

Distention, No Mass


Extremities: Normal Inspection, Normal Capillary Refill, Pedal Edema (2+ to the 

mid-shin bilaterally)


Peripheral Pulses: 2+: Radial (L), Radial (R)


Skin: Warm, Dry, Intact





- Problem List & Annotations


(1) Respiratory failure


SNOMED Code(s): 972793146


   Code(s): J96.90 - RESPIRATORY FAILURE, UNSP, UNSP W HYPOXIA OR HYPERCAPNIA   

Status: Acute   Current Visit: Yes   


Qualifiers: 


   Chronicity: acute on chronic   Respiratory failure complication: hypoxia and 

hypercapnia   Qualified Code(s): J96.21 - Acute and chronic respiratory failure 

with hypoxia; J96.22 - Acute and chronic respiratory failure with hypercapnia   





(2) COPD with acute exacerbation


SNOMED Code(s): 476797638


   Code(s): J44.1 - CHRONIC OBSTRUCTIVE PULMONARY DISEASE W (ACUTE) 

EXACERBATION   Status: Acute   Current Visit: Yes   





(3) CHF, acute on chronic


SNOMED Code(s): 063042710, 01363632863271


   Code(s): I50.9 - HEART FAILURE, UNSPECIFIED   Status: Acute   Priority: High

   Current Visit: Yes   


Qualifiers: 


   Heart failure type: combined systolic and diastolic   Qualified Code(s): 

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

congestive) heart failure   





(4) Atrial fibrillation


SNOMED Code(s): 42099513


   Code(s): I48.91 - UNSPECIFIED ATRIAL FIBRILLATION   Status: Chronic   

Priority: Medium   Current Visit: Yes   


Qualifiers: 


   Atrial fibrillation type: paroxysmal   Qualified Code(s): I48.0 - Paroxysmal 

atrial fibrillation   





(5) Diabetes


SNOMED Code(s): 91695377


   Code(s): E11.9 - TYPE 2 DIABETES MELLITUS WITHOUT COMPLICATIONS   Status: 

Chronic   Priority: High   Current Visit: No   


Qualifiers: 


   Diabetes mellitus type: type 2   Diabetes mellitus long term insulin use: 

with long term use   Diabetes mellitus complication status: with hyperglycemia 

  Qualified Code(s): E11.65 - Type 2 diabetes mellitus with hyperglycemia; 

Z79.4 - Long term (current) use of insulin   





(6) Hypertension


SNOMED Code(s): 98069405


   Code(s): I10 - ESSENTIAL (PRIMARY) HYPERTENSION   Status: Chronic   Current 

Visit: Yes   


Qualifiers: 


   Hypertension type: essential hypertension   Qualified Code(s): I10 - 

Essential (primary) hypertension   





- Problem List Review


Problem List Initiated/Reviewed/Updated: Yes





- My Orders


Last 24 Hours: 


My Active Orders





08/31/19 09:48


Furosemide [Lasix]   60 mg IV ONETIME ONE 





08/31/19 12:00


predniSONE   40 mg PO WITHBREAKFAST 














- Assessment


Assessment:: 





85 yo male hospital day #2 for acute on chronic respiratory failure secondary 

to COPD and CHF exacerbation. Doing much better today. Weaned off BiPap this 

morning without incident.





- Plan


Plan:: 





1.  Acute on Chronic Hypoxic Respiratory Failure, secondary to #2 and #3 





- He is on about 1 L more via NC that his baseline after the BiPap was 

discontinued this morning.


- Will continue to wean as able.


- Will resume BiPap PRN for any respiratory failure.





2. Acute on chronic combined diastolic and systolic heart failure





- Unclear I/O balance given incontinence overnight. Weight today is up from 

yesterday. Creatinine is stable.


- Will give another 60 mg IV lasix this morning and monitor I/O throughout the 

day. If patient continues to do well today, will consider given afternoon dose 

PO instead of IV.





3. COPD with acute exacerbation





- Will transition to PO steroids today given dramatic improvement overnight. 


- D/C solu-medrol and start prednisone daily instead.


- Continue scheduled and PRN nebs.


- No role for antibiotics at this point. Will reconsider if he worsens or does 

not continue to improve.





4.  Chronic a. fib


5.  Diabetes 


6.  HTN





- Continue medications as currently ordered.





Patient will remain on acute status at this time - if he continues to improve 

at this pace, he may be prepared for d/c home as soon as tomorrow. Patient is 

on eliquis chronically - no other VTE prophylaxis indicated. Code status is DNR/

DNI - discussed with PCP on admission.

## 2019-09-01 NOTE — [id number].[id number]
**Discharge Summary





- Hospital Course


Brief History: Mr. Emery is an 85 yo male who was admitted with CHF and COPD 

exacerbation after presenting to the ER for evaluation of weight gain and 

shortness of breath.





- Discharge Data


Discharge Date: 09/01/19


Discharge Disposition: Home, Self-Care 01


Condition: Good





- Discharge Diagnosis/Problem(s)


(1) Respiratory failure


SNOMED Code(s): 375339977


   ICD Code: J96.90 - RESPIRATORY FAILURE, UNSP, UNSP W HYPOXIA OR HYPERCAPNIA 

  Status: Acute   Current Visit: Yes   


Qualifiers: 


   Chronicity: acute on chronic   Respiratory failure complication: hypoxia and 

hypercapnia   Qualified Code(s): J96.21 - Acute and chronic respiratory failure 

with hypoxia; J96.22 - Acute and chronic respiratory failure with hypercapnia   





(2) COPD with acute exacerbation


SNOMED Code(s): 817347536


   ICD Code: J44.1 - CHRONIC OBSTRUCTIVE PULMONARY DISEASE W (ACUTE) 

EXACERBATION   Status: Acute   Current Visit: Yes   





(3) CHF, acute on chronic


SNOMED Code(s): 629299172, 27393997250784


   ICD Code: I50.9 - HEART FAILURE, UNSPECIFIED   Status: Acute   Priority: 

High   Current Visit: Yes   


Qualifiers: 


   Heart failure type: combined systolic and diastolic   Qualified Code(s): 

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

congestive) heart failure   





(4) Atrial fibrillation


SNOMED Code(s): 20199370


   ICD Code: I48.91 - UNSPECIFIED ATRIAL FIBRILLATION   Status: Chronic   

Priority: Medium   Current Visit: Yes   


Qualifiers: 


   Atrial fibrillation type: paroxysmal   Qualified Code(s): I48.0 - Paroxysmal 

atrial fibrillation   





(5) Diabetes


SNOMED Code(s): 13500910


   ICD Code: E11.9 - TYPE 2 DIABETES MELLITUS WITHOUT COMPLICATIONS   Status: 

Chronic   Priority: High   Current Visit: No   


Qualifiers: 


   Diabetes mellitus type: type 2   Diabetes mellitus long term insulin use: 

with long term use   Diabetes mellitus complication status: with hyperglycemia 

  Qualified Code(s): E11.65 - Type 2 diabetes mellitus with hyperglycemia; 

Z79.4 - Long term (current) use of insulin   





(6) Hypertension


SNOMED Code(s): 67802244


   ICD Code: I10 - ESSENTIAL (PRIMARY) HYPERTENSION   Status: Chronic   Current 

Visit: Yes   


Qualifiers: 


   Hypertension type: essential hypertension   Qualified Code(s): I10 - 

Essential (primary) hypertension   





- Patient Summary/Data


Operative Procedure(s) Performed: none


Complications: none


Consults: 





none


Labs Pending at D/C: 





none


Recommended Follow-up Testing/Procedures: 





none


Planned Operative Procedure(s) after DC: none


Hospital Course: 





The patient was admitted and started on BiPap. He quickly improved and was 

weaned off BiPap to NC as of the morning of hospital day #2. He has been on his 

usual home oxygen via NC for >24 hours. He was initially on IV steroids and 

then weaned to PO steroids as of yesterday am as well. He has responded well to 

IV lasix and similarly has transitioned to PO lasix without any issues. His 

labs have been stable. His hospitalization has otherwise been uncomplicated. He 

was able to walk in the hallways today with only standby assistance. He 

expresses interest in discharging home today; his wife is reasonably 

comfortable with this as well.





Part of the contributor to his admission was a non-working neb machine and his 

wife was able to borrow one from a family member. He will be discharged home to 

complete a course of prednisone and will be maintained on his usual inhalers/

nebulizer treatments. He will be dismissed on a higher dose of lasix to take 

temporarily. He will need to follow up with his primary this week to adjust his 

lasix dosing as needed.





- Patient Instructions


Diet: Usual Diet as Tolerated





- Discharge Plan


*PRESCRIPTION DRUG MONITORING PROGRAM REVIEWED*: No


*COPY OF PRESCRIPTION DRUG MONITORING REPORT IN PATIENT DINA: No


Prescriptions/Med Rec: 


Furosemide [Lasix] 80 mg PO BID #1 tablet


Home Medications: 


 Home Meds





Ascorbic Acid [Vitamin C] 1,000 mg PO DAILY 08/04/17 [History]


Liraglutide [Victoza] 1.8 mg SUBCUT DAILY 08/04/17 [History]


Terbinafine [LamISIL AT 1% Crm] 1 applic TOP BID 08/04/17 [History]


Apixaban [Eliquis] 5 mg PO BID 05/15/18 [History]


Ergocalciferol (Vitamin D2) [Vitamin D2] 2,000 unit PO DAILY 05/15/18 [History]


Triamcinolone Acetonide [Kenalog 0.1% Crm] 1 applic TOP TID PRN 05/15/18 [

History]


Metoprolol Succinate [Toprol XL 50mg] 150 mg PO DAILY #90 tab.er 05/24/18 [Rx]


Arformoterol [Brovana] 15 mcg NEB BID 08/08/18 [History]


Budesonide [Pulmicort] 0.5 mg NEB BID 08/08/18 [History]


Ferrous Sulfate 325 mg PO DAILY 08/09/18 [History]


Cetirizine [ZyrTEC] 10 mg PO DAILY 08/30/19 [History]


Folic Acid 1 mg PO DAILY 08/30/19 [History]


Furosemide [Lasix] 40 mg PO 1600 08/30/19 [History]


Hydrocodone/Acetaminophen [Hydrocodon-Acetaminophen 5-325] 1 tab PO BID PRN 08/ 30/19 [History]


Insuln Asp Prot/Insulin Aspart [NovoLOG Mix 70-30] 24 units SQ BIDMEALS 08/30/ 19 [History]


Ipratropium [Atrovent] 0.5 mg INH Q4HR PRN 08/30/19 [History]


predniSONE [Prednisone] 2.5 mg PO DAILY 08/30/19 [History]


sulfaSALAzine [Azulfidine] 1,000 mg PO BID 08/30/19 [History]


Furosemide [Lasix] 80 mg PO BID #1 tablet 09/01/19 [Rx]


predniSONE 40 mg PO WITHBREAKFAST  tablet 09/01/19 [Rx]








Patient Handouts:  Heart Failure, Easy-to-Read, Prednisone tablets


Forms:  ED Department Discharge


Referrals: 


Chayito Peterson DO [Primary Care Provider] - 





- Discharge Summary/Plan Comment


DC Time >30 min.: No





- General Info


Date of Service: 09/01/19


Subjective Update: 





85 yo male hospital day #3 admitted for COPD and CHF exacerbation.





He is doing very well today and would like to be discharged home. He states he 

feels back to his normal self. He denies any shortness of breath or cough. He 

was able to walk in the hallways with nursing without any shortness of breath 

or weakness. He did not need any assistance with walking.





- Review of Systems


General: Reports: No Symptoms


HEENT: Reports: No Symptoms


Pulmonary: Reports: No Symptoms


Cardiovascular: Reports: No Symptoms


Gastrointestinal: Reports: No Symptoms


Genitourinary: Reports: No Symptoms


Musculoskeletal: Reports: No Symptoms


Skin: Reports: No Symptoms


Neurological: Reports: No Symptoms





- Patient Data


Vitals - Most Recent: 


 Last Vital Signs











Temp  36.7 C   09/01/19 10:00


 


Pulse  102 H  09/01/19 10:00


 


Resp  24 H  09/01/19 05:57


 


BP  140/72   09/01/19 10:00


 


Pulse Ox  98   09/01/19 10:00











Weight - Most Recent: 105.687 kg


I&O - Last 24 hours: 


 Intake & Output











 08/31/19 09/01/19 09/01/19





 22:59 06:59 14:59


 


Intake Total 240 240 540


 


Output Total 300  


 


Balance -60 240 540











Lab Results - Last 24 hrs: 


 Laboratory Results - last 24 hr











  08/31/19 08/31/19 09/01/19 Range/Units





  17:12 20:09 05:41 


 


WBC     (4.0-10.0)  x10^3/uL


 


RBC     (4.5-6.0)  x10^6/uL


 


Hgb     (14.0-18.0)  g/dL


 


Hct     (40.0-52.0)  %


 


MCV     (78.0-93.0)  fL


 


MCH     (26.0-32.0)  pg


 


MCHC     (32.0-36.0)  g/dL


 


RDW Coeff of Brandon     (10.0-15.0)  %


 


Plt Count     (130-400)  x10^3/uL


 


Neut % (Auto)     (50.0-80.0)  %


 


Lymph % (Auto)     (25.0-50.0)  %


 


Mono % (Auto)     (2.0-11.0)  %


 


Eos % (Auto)     (0.0-4.0)  %


 


Baso % (Auto)     (0.2-1.2)  %


 


Sodium     (136-145)  mmol/L


 


Potassium     (3.5-5.1)  mmol/L


 


Chloride     ()  mmol/L


 


Carbon Dioxide     (21-32)  mmol/L


 


Anion Gap     (10-20)  mmol/L


 


BUN     (7-18)  mg/dL


 


Creatinine     (0.70-1.30)  mg/dL


 


Est Cr Clr Drug Dosing     mL/min


 


Estimated GFR (MDRD)     


 


Glucose     ()  mg/dL


 


POC Glucose  354 H  383 H  170 H  ()  mg/dL


 


Calcium     (8.5-10.1)  mg/dL














  09/01/19 09/01/19 09/01/19 Range/Units





  07:55 07:55 11:06 


 


WBC  6.0    (4.0-10.0)  x10^3/uL


 


RBC  3.51 L    (4.5-6.0)  x10^6/uL


 


Hgb  10.7 L    (14.0-18.0)  g/dL


 


Hct  34.9 L    (40.0-52.0)  %


 


MCV  99.4 H    (78.0-93.0)  fL


 


MCH  30.5    (26.0-32.0)  pg


 


MCHC  30.7 L    (32.0-36.0)  g/dL


 


RDW Coeff of Brandon  13.7    (10.0-15.0)  %


 


Plt Count  193    (130-400)  x10^3/uL


 


Neut % (Auto)  69.5    (50.0-80.0)  %


 


Lymph % (Auto)  19.6 L    (25.0-50.0)  %


 


Mono % (Auto)  9.4    (2.0-11.0)  %


 


Eos % (Auto)  1.2    (0.0-4.0)  %


 


Baso % (Auto)  0.3    (0.2-1.2)  %


 


Sodium   139   (136-145)  mmol/L


 


Potassium   3.5  D   (3.5-5.1)  mmol/L


 


Chloride   92 L   ()  mmol/L


 


Carbon Dioxide   43 H   (21-32)  mmol/L


 


Anion Gap   7.5 L   (10-20)  mmol/L


 


BUN   23 H   (7-18)  mg/dL


 


Creatinine   1.0   (0.70-1.30)  mg/dL


 


Est Cr Clr Drug Dosing   47.83   mL/min


 


Estimated GFR (MDRD)   > 60   


 


Glucose   169 H   ()  mg/dL


 


POC Glucose    192 H  ()  mg/dL


 


Calcium   9.2   (8.5-10.1)  mg/dL











RUMA Results - Last 24 hrs: 


 Microbiology











 08/30/19 12:23 Aerobic Blood Culture - Preliminary





 Blood - Venous - Lab Draw    NO GROWTH AFTER 2 DAYS





 Anaerobic Blood Culture - Preliminary





    NO GROWTH AFTER 2 DAYS


 


 08/30/19 12:13 Aerobic Blood Culture - Preliminary





 Blood - Venous    NO GROWTH AFTER 2 DAYS





 Anaerobic Blood Culture - Preliminary





    NO GROWTH AFTER 2 DAYS











Med Orders - Current: 


 Current Medications





Hydrocodone Bitart/Acetaminophen (Norco 325-5 Mg)  1 tab PO BID PRN


   PRN Reason: Pain (moderate 4-6)


   Last Admin: 09/01/19 10:13 Dose:  1 tab


Albuterol/Ipratropium (Duoneb 3.0-0.5 Mg/3 Ml)  3 ml NEB Q4H PRN


   PRN Reason: dyspnea/wheezing


Albuterol/Ipratropium (Duoneb 3.0-0.5 Mg/3 Ml)  3 ml NEB Q4HRRT [facility]


   Last Admin: 09/01/19 11:04 Dose:  3 ml


Apixaban (Eliquis)  5 mg PO BID [facility]


   Last Admin: 09/01/19 07:17 Dose:  5 mg


Arformoterol Tartrate (Brovana)  15 mcg NEB BID [facility]


   Last Admin: 09/01/19 07:18 Dose:  15 mcg


Folic Acid (Folic Acid)  1 mg PO DAILY [facility]


   Last Admin: 09/01/19 07:16 Dose:  1 mg


Furosemide (Lasix)  80 mg PO DAILY [facility]


   Last Admin: 09/01/19 10:14 Dose:  80 mg


Furosemide (Lasix)  40 mg PO 1600 [facility]


Insulin Lispro Protam/Lispro Human (Humalog Mix 75-25)  24 unit SUBCUT BIDMEALS 

[facility]


   Last Admin: 09/01/19 07:21 Dose:  24 unit


Loratadine (Claritin)  10 mg PO DAILY [facility]


   Last Admin: 09/01/19 07:16 Dose:  10 mg


Metoprolol Succinate (Toprol Xl)  150 mg PO DAILY [facility]


   Last Admin: 09/01/19 07:17 Dose:  150 mg


Sulfasalazine 500 Mg (**Own Med**)  0 mg PO BID [facility]


   Last Admin: 09/01/19 07:20 Dose:  1,000 mg


Prednisone (Prednisone)  40 mg PO WITHBREAKFAST [facility]


   Last Admin: 09/01/19 07:16 Dose:  40 mg


Sodium Chloride (Saline Flush)  10 ml FLUSH ASDIRECTED PRN


   PRN Reason: Keep Vein Open





Discontinued Medications





Furosemide (Lasix)  60 mg PO ONETIME ONE


   Stop: 08/30/19 11:57


   Last Admin: 08/30/19 12:17 Dose:  Not Given


Furosemide (Lasix)  60 mg IV ONETIME ONE


   Stop: 08/30/19 12:16


   Last Admin: 08/30/19 12:19 Dose:  60 mg


Furosemide (Lasix)  60 mg IV ONETIME ONE


   Stop: 08/31/19 09:49


   Last Admin: 08/31/19 11:23 Dose:  60 mg


Furosemide (Lasix)  40 mg PO ONETIME ONE


   Stop: 08/31/19 16:01


   Last Admin: 08/31/19 15:27 Dose:  40 mg


Methylprednisolone Sodium Succinate (Solu-Medrol)  40 mg IVPUSH Q12H [facility]


   Last Admin: 08/31/19 03:08 Dose:  40 mg


Potassium Chloride (Klor-Con 10)  20 meq PO ONETIME ONE


   Stop: 08/30/19 15:34


   Last Admin: 08/30/19 16:30 Dose:  20 meq











- Exam


General: Reports: Alert, Oriented, Cooperative, No Acute Distress


HEENT: Reports: Mucous Membr. Moist/Pink


Neck: Reports: Supple, Trachea Midline, No Thyromegaly.  Denies: Lymphadenopathy


Lungs: Reports: Clear to Auscultation, Normal Respiratory Effort


Cardiovascular: Reports: Regular Rate, No Murmurs, Irregular Rhythm


GI/Abdominal Exam: Normal Bowel Sounds, Soft, Non-Tender, No Organomegaly, No 

Distention, No Mass


Extremities: Pedal Edema (trace pedal edema to just the ankles bilaterally)


Skin: Reports: Warm, Dry, Intact


Neurological: Reports: No New Focal Deficit

## 2020-02-06 NOTE — CR
______________________________________________________________________________   

  

[id number] RAD/RAD Chest PA or AP 1V  

EXAM:  RAD Chest PA or AP 1V  

   

 INDICATION:  DYSPNEA.  

   

 COMPARISON:  August 30, 2019.  

   

 DISCUSSION:    

   

 Cardiomegaly and central vascular congestion, similar to prior examination.  

   

 COPD.  

   

 Streaky parenchymal opacities in both lung bases are nonspecific. Findings are  

 most consistent with subsegmental atelectasis or scarring.  

   

 IMPRESSION:  

 Chronic findings are described above.  

   

 Electronically signed by Moncho Rdz MD on 2/6/2020 1:16 PM  

   

  

Moncho Rdz MD                 

 02/06/20 [id number]    

  

Thank you for allowing us to participate in the care of your patient.

## 2020-02-06 NOTE — EDM.PDOC
ED HPI GENERAL MEDICAL PROBLEM





- General


Stated Complaint: ER


Time Seen by Provider: 02/06/20 12:20


Source of Information: Reports: Patient


History Limitations: Reports: No Limitations





- History of Present Illness


INITIAL COMMENTS - FREE TEXT/NARRATIVE: 





Pt. presents to ER with complaints of shortness of breath. Wife states that he 

has been experiencing increased work of breathing today. Denies any fever or 

chills. Pt. has a history of CHF and COPD. He was hospitalized in Sept. with 

hypoxia and hypercarbia in acute respiratory failure thought to be secondary 

primarily to COPD. Wife state that he has a history of diastolic HF, and is on 

lasix 80mg a day and Zaroxyn 2.5mg daily. He has been afebrile. 


EMS was summoned. They noticed wet lung sounds and gave the patient lasix 40mg 

IV and was given a duoneb.


He has a history of atrial fibrillation and is on Eliquis. He is on Toprol XL 

150mg daily for rate control. Denies any episodes of palpitations, racing heart

, chest pain, or shortness of breath. He offers on complaint on arrival to ER.


Pt. is on home O2 at 4L/min.


Onset Date: 02/06/20


Location: Reports: Chest, Generalized


Associated Symptoms: Reports: Malaise, Shortness of Breath.  Denies: Chest Pain

, Cough, Diaphoresis, Fever/Chills, Headaches, Nausea/Vomiting, Rash, Seizure, 

Syncope, Weakness





- Related Data


 Allergies











Allergy/AdvReac Type Severity Reaction Status Date / Time


 


No Known Allergies Allergy   Verified 02/06/20 13:03











Home Meds: 


 Home Meds





Ascorbic Acid [Vitamin C] 1,000 mg PO DAILY 08/04/17 [History]


Liraglutide [Victoza] 1.8 mg SUBCUT DAILY 08/04/17 [History]


Terbinafine [LamISIL AT 1% Crm] 1 applic TOP BID 08/04/17 [History]


Apixaban [Eliquis] 5 mg PO BID 05/15/18 [History]


Ergocalciferol (Vitamin D2) [Vitamin D2] 2,000 unit PO DAILY 05/15/18 [History]


Triamcinolone Acetonide [Kenalog 0.1% Crm] 1 applic TOP TID PRN 05/15/18 [

History]


Metoprolol Succinate [Toprol XL 50mg] 150 mg PO DAILY #90 tab.er 05/24/18 [Rx]


Arformoterol [Brovana] 15 mcg NEB BID 08/08/18 [History]


Budesonide [Pulmicort] 0.5 mg NEB BID 08/08/18 [History]


Ferrous Sulfate 325 mg PO DAILY 08/09/18 [History]


Cetirizine [ZyrTEC] 10 mg PO DAILY 08/30/19 [History]


Folic Acid 1 mg PO DAILY 08/30/19 [History]


Furosemide [Lasix] 40 mg PO 1600 08/30/19 [History]


Hydrocodone/Acetaminophen [Hydrocodon-Acetaminophen 5-325] 1 tab PO BID PRN 08/ 30/19 [History]


Insuln Asp Prot/Insulin Aspart [NovoLOG Mix 70-30] 24 units SQ BIDMEALS 08/30/ 19 [History]


Ipratropium [Atrovent] 0.5 mg INH Q4HR PRN 08/30/19 [History]


predniSONE [Prednisone] 2.5 mg PO DAILY 08/30/19 [History]


sulfaSALAzine [Azulfidine] 1,000 mg PO BID 08/30/19 [History]


Furosemide [Lasix] 80 mg PO BID #1 tablet 09/01/19 [Rx]


predniSONE 40 mg PO WITHBREAKFAST  tablet 09/01/19 [Rx]











Past Medical History


HEENT History: Reports: None


Cardiovascular History: Reports: Afib, Heart Failure, High Cholesterol, 

Hypertension


Respiratory History: Reports: COPD


Other Respiratory History: Pt is on ome O2


Musculoskeletal History: Reports: None, Arthritis (rheumatoid), Osteoarthritis, 

Osteoporosis, RA


Endocrine/Metabolic History: Reports: Diabetes, Type II





- Infectious Disease History


Infectious Disease History: Reports: None





- Past Surgical History


Musculoskeletal Surgical History: Reports: None





Social & Family History





- Family History


Respiratory: Reports: None


GI: Reports: None


: Reports: None


Neurological: Reports: MS


Endocrine/Metabolic: Reports: Diabetes, type II





- Caffeine Use


Caffeine Use: Reports: Coffee





ED ROS GENERAL





- Review of Systems


Review Of Systems: See Below


Constitutional: Reports: No Symptoms, Malaise.  Denies: Fever, Chills, Weakness

, Fatigue, Diaphoresis


HEENT: Reports: No Symptoms


Respiratory: Reports: Shortness of Breath, Wheezing


Cardiovascular: Reports: No Symptoms


Endocrine: Reports: No Symptoms


GI/Abdominal: Reports: No Symptoms


: Reports: No Symptoms


Musculoskeletal: Reports: No Symptoms


Skin: Reports: No Symptoms


Neurological: Reports: No Symptoms


Psychiatric: Reports: No Symptoms


Hematologic/Lymphatic: Reports: No Symptoms


Immunologic: Reports: No Symptoms





ED EXAM, GENERAL





- Physical Exam


Exam: See Below


Exam Limited By: No Limitations


General Appearance: Alert, WD/WN, Moderate Distress


Eye Exam: Bilateral Eye: EOMI, PERRL


Throat/Mouth: Normal Inspection, Normal Lips, Normal Teeth, Normal Oropharynx, 

Normal Voice, No Airway Compromise


Head: Atraumatic, Normocephalic


Neck: Normal Inspection, Supple, Non-Tender, Full Range of Motion


Respiratory/Chest: No Accessory Muscle Use, Respiratory Distress, Crackles, 

Rales


Cardiovascular: Normal Peripheral Pulses, Irregularly Irregular, Other (mild 

peripheral edema)


GI/Abdominal: Normal Bowel Sounds, Soft, Non-Tender, No Organomegaly, No 

Distention, No Mass


 (Male) Exam: Deferred


Rectal (Males) Exam: Deferred


Back Exam: Normal Inspection


Extremities: Normal Inspection, Normal Range of Motion, Non-Tender, No Pedal 

Edema


Neurological: Alert, Oriented, CN II-XII Intact, Normal Cognition, Normal 

Reflexes, No Motor/Sensory Deficits


Psychiatric: Normal Affect, Normal Mood


Skin Exam: Warm, Dry, Intact, Normal Color, No Rash


Lymphatic: No Adenopathy





EKG INTERPRETATION


Rhythm: A-Fib





Course





- Vital Signs


Last Recorded V/S: 


 Last Vital Signs











Temp  36.6 C   02/06/20 14:28


 


Pulse  82   02/06/20 14:28


 


Resp  23 H  02/06/20 14:28


 


BP  121/56 L  02/06/20 14:28


 


Pulse Ox  95   02/06/20 14:28














- Orders/Labs/Meds


Orders: 


 Active Orders 24 hr











 Category Date Time Status


 


 BIPAP Adult [RT BiPAP/CPAP] [RC] ASDIRECTED Care  02/06/20 13:43 Active


 


 Cardiac Monitoring [RC] CONTINUOUS Care  02/06/20 12:28 Active


 


 Oxygen Therapy [RC] .PRN Care  02/06/20 12:28 Active


 


 CULTURE BLOOD [BC] Stat Lab  02/06/20 13:00 Results


 


 CULTURE BLOOD [BC] Stat Lab  02/06/20 13:06 Results


 


 Blood Culture x2 Reflex Set [OM.PC] Stat Oth  02/06/20 12:26 Ordered








 Medication Orders





Albuterol/Ipratropium (Duoneb 3.0-0.5 Mg/3 Ml)  3 ml NEB Q4H PRN


   PRN Reason: dyspnea/wheezing


Albuterol/Ipratropium (Duoneb 3.0-0.5 Mg/3 Ml)  3 ml NEB Q4HRRT [facility]


   Last Admin: 02/06/20 14:43  Dose: 3 ml


Apixaban (Eliquis)  5 mg PO BID [facility]


Arformoterol Tartrate (Brovana)  15 mcg NEB BIDRT [facility]


Budesonide (Pulmicort)  0.5 mg NEB BID [facility]


Doxycycline Hyclate (Vibramycin)  100 mg PO BID [facility]


Folic Acid (Folic Acid)  1 mg PO DAILY [facility]


Magnesium Sulfate 2 gm/ Premix  50 mls @ 25 mls/hr IV ONETIME ONE


   Stop: 02/06/20 16:33


Insulin Lispro Protam/Lispro Human (Humalog Mix 75-25)  24 unit SUBCUT BIDMEALS 

[facility]


Loratadine (Claritin)  10 mg PO DAILY [facility]


Lorazepam (Ativan)  0.5 mg IVPUSH Q4H PRN


   PRN Reason: Anxiety


Metoprolol Succinate (Toprol Xl)  150 mg PO DAILY [facility]


Ondansetron HCl (Zofran)  4 mg IV Q4H PRN


   PRN Reason: Nausea/Vomiting


Sulfasalazine (Sulfasalazine Dr)  1,000 mg PO BID [facility]








Labs: 


 Laboratory Tests











  02/06/20 02/06/20 02/06/20 Range/Units





  12:25 13:06 13:06 


 


WBC  7.2    (4.0-10.0)  x10^3/uL


 


RBC  3.63 L    (4.5-6.0)  x10^6/uL


 


Hgb  10.9 L    (14.0-18.0)  g/dL


 


Hct  37.3 L    (40.0-52.0)  %


 


MCV  102.8 H D    (78.0-93.0)  fL


 


MCH  30.0    (26.0-32.0)  pg


 


MCHC  29.2 L    (32.0-36.0)  g/dL


 


RDW Coeff of Brandon  13.5    (10.0-15.0)  %


 


Plt Count  197    (130-400)  x10^3/uL


 


Neut % (Auto)  82.3 H    (50.0-80.0)  %


 


Lymph % (Auto)  9.1 L    (25.0-50.0)  %


 


Mono % (Auto)  7.4    (2.0-11.0)  %


 


Eos % (Auto)  1.1    (0.0-4.0)  %


 


Baso % (Auto)  0.1 L    (0.2-1.2)  %


 


PT   10.3   (10.0-12.8)  SEC


 


INR   0.9 L   (2.0-3.5)  


 


POC ABG pH     (7.35-7.45)  


 


POC ABG pCO2     (35-45)  mmHG


 


POC ABG pO2     ()  mmHG


 


POC ABG HCO3     (22-26)  mmol/L


 


POC ABG O2 Sat     (95-98)  %


 


POC ABG Base Excess     (-2-3)  mmol/L


 


POC FiO2     


 


Sodium    140  (136-145)  mmol/L


 


Potassium    4.9  (3.5-5.1)  mmol/L


 


Chloride    92 L  ()  mmol/L


 


Carbon Dioxide    46 H  (21-32)  mmol/L


 


Anion Gap    6.9 L  (10-20)  mmol/L


 


BUN    15  (7-18)  mg/dL


 


Creatinine    0.9  (0.70-1.30)  mg/dL


 


Est Cr Clr Drug Dosing    TNP  


 


Estimated GFR (MDRD)    > 60  


 


Glucose    173 H  ()  mg/dL


 


Lactic Acid     (0.4-2.0)  mmol/L


 


Calcium    9.4  (8.5-10.1)  mg/dL


 


Corrected Calcium    9.80  (8.5-10.1)  mg/dL


 


Phosphorus    3.6  (2.6-4.7)  mg/dL


 


Magnesium    1.7 L  (1.8-2.4)  mg/dL


 


Total Bilirubin    0.4  (0.2-1.0)  mg/dL


 


AST    13 L  (15-37)  U/L


 


ALT    13 L  (16-63)  U/L


 


Alkaline Phosphatase    83  ()  U/L


 


Troponin I    < 0.017  (<=0.056)  ng/mL


 


C-Reactive Protein    3.7 H  (<=0.9)  mg/dL


 


NT-Pro-B Natriuret Pep    3454 H  (<=450)  pg/mL


 


Total Protein    7.3  (6.4-8.2)  g/dL


 


Albumin    3.5  (3.4-5.0)  g/dL


 


Globulin    3.8  


 


Albumin/Globulin Ratio    0.92  


 


TSH, Ultra Sensitive    0.701  (0.358-3.74)  uIU/mL


 


POC Result Comm     














  02/06/20 02/06/20 Range/Units





  13:06 13:20 


 


WBC    (4.0-10.0)  x10^3/uL


 


RBC    (4.5-6.0)  x10^6/uL


 


Hgb    (14.0-18.0)  g/dL


 


Hct    (40.0-52.0)  %


 


MCV    (78.0-93.0)  fL


 


MCH    (26.0-32.0)  pg


 


MCHC    (32.0-36.0)  g/dL


 


RDW Coeff of Brandon    (10.0-15.0)  %


 


Plt Count    (130-400)  x10^3/uL


 


Neut % (Auto)    (50.0-80.0)  %


 


Lymph % (Auto)    (25.0-50.0)  %


 


Mono % (Auto)    (2.0-11.0)  %


 


Eos % (Auto)    (0.0-4.0)  %


 


Baso % (Auto)    (0.2-1.2)  %


 


PT    (10.0-12.8)  SEC


 


INR    (2.0-3.5)  


 


POC ABG pH   7.350  (7.35-7.45)  


 


POC ABG pCO2   99 H*  (35-45)  mmHG


 


POC ABG pO2   48 L*  ()  mmHG


 


POC ABG HCO3   55 H  (22-26)  mmol/L


 


POC ABG O2 Sat   77 L  (95-98)  %


 


POC ABG Base Excess   29 H  (-2-3)  mmol/L


 


POC FiO2   0.36  


 


Sodium    (136-145)  mmol/L


 


Potassium    (3.5-5.1)  mmol/L


 


Chloride    ()  mmol/L


 


Carbon Dioxide    (21-32)  mmol/L


 


Anion Gap    (10-20)  mmol/L


 


BUN    (7-18)  mg/dL


 


Creatinine    (0.70-1.30)  mg/dL


 


Est Cr Clr Drug Dosing    


 


Estimated GFR (MDRD)    


 


Glucose    ()  mg/dL


 


Lactic Acid  1.9   (0.4-2.0)  mmol/L


 


Calcium    (8.5-10.1)  mg/dL


 


Corrected Calcium    (8.5-10.1)  mg/dL


 


Phosphorus    (2.6-4.7)  mg/dL


 


Magnesium    (1.8-2.4)  mg/dL


 


Total Bilirubin    (0.2-1.0)  mg/dL


 


AST    (15-37)  U/L


 


ALT    (16-63)  U/L


 


Alkaline Phosphatase    ()  U/L


 


Troponin I    (<=0.056)  ng/mL


 


C-Reactive Protein    (<=0.9)  mg/dL


 


NT-Pro-B Natriuret Pep    (<=450)  pg/mL


 


Total Protein    (6.4-8.2)  g/dL


 


Albumin    (3.4-5.0)  g/dL


 


Globulin    


 


Albumin/Globulin Ratio    


 


TSH, Ultra Sensitive    (0.358-3.74)  uIU/mL


 


POC Result Comm   Called critical res  











Meds: 


Medications











Generic Name Dose Route Start Last Admin





  Trade Name Freq  PRN Reason Stop Dose Admin


 


Albuterol/Ipratropium  3 ml  02/06/20 14:21  





  Duoneb 3.0-0.5 Mg/3 Ml  NEB   





  Q4H PRN   





  dyspnea/wheezing   





     





     





     


 


Albuterol/Ipratropium  3 ml  02/06/20 15:00  02/06/20 14:43





  Duoneb 3.0-0.5 Mg/3 Ml  NEB   3 ml





  Q4HRRT [facility]   Administration





     





     





     





     


 


Apixaban  5 mg  02/06/20 20:00  





  Eliquis  PO   





  BID [facility]   





     





     





     





     


 


Arformoterol Tartrate  15 mcg  02/06/20 20:00  





  Brovana  NEB   





  BIDRT [facility]   





     





     





     





     


 


Budesonide  0.5 mg  02/06/20 20:00  





  Pulmicort  NEB   





  BID [facility]   





     





     





     





     


 


Doxycycline Hyclate  100 mg  02/06/20 14:30  





  Vibramycin  PO   





  BID [facility]   





     





     





     





     


 


Folic Acid  1 mg  02/07/20 08:00  





  Folic Acid  PO   





  DAILY [facility]   





     





     





     





     


 


Magnesium Sulfate 2 gm/ Premix  50 mls @ 25 mls/hr  02/06/20 14:34  





  IV  02/06/20 16:33  





  ONETIME ONE   





     





     





     





     


 


Insulin Lispro Protam/Lispro Human  24 unit  02/06/20 18:00  





  Humalog Mix 75-25  SUBCUT   





  BIDMEALS [facility]   





     





     





     





     


 


Loratadine  10 mg  02/07/20 08:00  





  Claritin  PO   





  DAILY [facility]   





     





     





     





     


 


Lorazepam  0.5 mg  02/06/20 14:30  





  Ativan  IVPUSH   





  Q4H PRN   





  Anxiety   





     





     





     


 


Metoprolol Succinate  150 mg  02/07/20 08:00  





  Toprol Xl  PO   





  DAILY [facility]   





     





     





     





     


 


Ondansetron HCl  4 mg  02/06/20 14:21  





  Zofran  IV   





  Q4H PRN   





  Nausea/Vomiting   





     





     





     


 


Sulfasalazine  1,000 mg  02/06/20 20:00  





  Sulfasalazine Dr  PO   





  BID [facility]   





     





     





     





     














Discontinued Medications














Generic Name Dose Route Start Last Admin





  Trade Name Freq  PRN Reason Stop Dose Admin


 


Furosemide  40 mg  02/06/20 14:45  





  Lasix  IV  02/06/20 14:46  





  ONETIME ONE   





     





     





     





     


 


Methylprednisolone Sodium Succinate  125 mg  02/06/20 12:52  02/06/20 13:03





  Solu-Medrol  IM  02/06/20 12:53  125 mg





  ONETIME ONE   Administration





     





     





     





     














- Radiology Interpretation


Free Text/Narrative:: 





CHF. No obvious infiltrate.








- Re-Assessments/Exams


Free Text/Narrative Re-Assessment/Exam: 


Pt. received IV lasix 40mg IV and duoneb in ambulance. Pt. was given solu 

medrol 125mg IV. He was started on BiPap at 15/5. RT to titrate.





Departure





- Departure


Time of Disposition: 14:00


Disposition: DC/Tfer to Acute Hospital 02


Clinical Impression: 


 CHF, Congestive heart failure, COPD with acute exacerbation





Respiratory failure


Qualifiers:


 Chronicity: acute on chronic Respiratory failure complication: hypoxia and 

hypercapnia Qualified Code(s): J96.21 - Acute and chronic respiratory failure 

with hypoxia





CHF, acute on chronic


Qualifiers:


 Heart failure type: combined systolic and diastolic Qualified Code(s): I50.43 

- Acute on chronic combined systolic (congestive) and diastolic (congestive) 

heart failure








- Discharge Information





Sepsis Event Note





- Focused Exam


Vital Signs: 


 Vital Signs











  Temp Pulse Resp BP Pulse Ox


 


 02/06/20 12:30  36.6 C  84  36 H  125/67  94 L











Date Exam was Performed: 02/06/20


Time Exam was Performed: 15:09





- Problem List Review


Problem List Initiated/Reviewed/Updated: Yes





- My Orders


Last 24 Hours: 


My Active Orders





02/06/20 12:26


Blood Culture x2 Reflex Set [OM.PC] Stat 





02/06/20 12:28


Cardiac Monitoring [RC] CONTINUOUS 


Oxygen Therapy [RC] .PRN 





02/06/20 13:00


CULTURE BLOOD [BC] Stat 





02/06/20 13:06


CULTURE BLOOD [BC] Stat 





02/06/20 13:43


BIPAP Adult [RT BiPAP/CPAP] [RC] ASDIRECTED 














- Assessment/Plan


Last 24 Hours: 


My Active Orders





02/06/20 12:26


Blood Culture x2 Reflex Set [OM.PC] Stat 





02/06/20 12:28


Cardiac Monitoring [RC] CONTINUOUS 


Oxygen Therapy [RC] .PRN 





02/06/20 13:00


CULTURE BLOOD [BC] Stat 





02/06/20 13:06


CULTURE BLOOD [BC] Stat 





02/06/20 13:43


BIPAP Adult [RT BiPAP/CPAP] [RC] ASDIRECTED 











Plan: 





Discussed findings with wife. On last admit, pt. was a code 2, DNR/DNI. When 

questioned today, pt. wife initially could not make a decision. Discussed 

patient's past medical history, previous illnesses, etc. and she wishes for 

patient to not receive CPR or mechanical ventilation. Pt. will be admitted 

acutely. Chayito Peterson will admit the patient. All questions were answered.

## 2020-02-07 NOTE — PN
Progress Note for GILBERT BARBER  Date:  02/07/2020  Room #:  VM.214

 

SUBJECTIVE:  This is hospital day #2 on an 84-year-old with known COPD admitted

for hypercapnic and hypoxic respiratory failure with unresponsiveness.  The 
patient is much

more responsive now that he has had a BiPAP overnight who was trying to take it

off several times and did get the Ativan.  He had excellent blood sugar last

night for supper and in fact got just 10 out of the 24 units after they called

me due to the fact that he was on and off BiPAP and we were not sure how much he

would be eating, but by bedtime, he was up over 300.  This morning, his glucose

was also elevated at 300.  He is getting his usual 24 units.  The patient states

he is feeling better.  He is not sure why he is here.  He does not remember what

happened.  He denies that he has been getting sick or coughing.  His wife said

he had been getting increasingly short of breath for the last few days and she

turned his oxygen up to 4 L.  The patient does not feel like he has been having

any more swelling.  He denies any pain.

 

OBJECTIVE:  Vital Signs:  His temperature is 97, pulse 88, blood pressure

104/52, respiratory rate 20, O2 of 91 on the 3 L.

General:  He is in no acute distress.

Heart:  Irregularly irregular.

Lungs:  Lung sounds are decreased with some rhonchi in both bases, but no

wheezing appreciated.

Abdomen:  Nondistended, nontender.

Extremities:  Warm and dry.  He has 2+ edema, probably mildly increased since

yesterday.

Mental Status:  He is alert and orientated x3.  He is answering questions

appropriately.

 

LABORATORY DATA:  Lab work does show his white count normal at 5.8, hemoglobin

is stable at 10.3, platelets at 195.  Sodium 139, potassium 4.4, chloride 91,

bicarb 44, BUN 21, creatinine 1.1.  Troponin 0.008.  Calcium 9.

 

ASSESSMENT:

1. Acute on chronic hypoxic and hypercapnic respiratory failure.  He was just

    taken off BiPAP this morning.  He really does not tolerate it that well.

    His repeat ABG was already improving.  I think we can check one again, at

    least a venous later today to see how his CO2 looks off the BiPAP for

    several hours.  Otherwise, of course, if he becomes more sedated, we would

    recheck it sooner.  We will continue him on doxycycline and schedule p.r.n.

    DuoNeb.  I am going to decrease the Solu-Medrol down to once daily since he

    is not having any wheezing and he got some hyperglycemia from it.

2. Chronic obstructive pulmonary disease exacerbation.  He is on the above

    treatments and his home nebulizers.

3. 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

    an acute exacerbation.  He will continue IV Lasix 40 mg twice daily.

4. Type 2 diabetes with hyperglycemia from steroids.  We will adjust insulin

    if needed.  Continue q.i.d. checks.

5. Rheumatoid arthritis.  He is on his home medications.

6. Anemia.  He is at baseline.

7. Atrial fibrillation, rate controlled.  I will discontinue telemetry.  He is

    on Eliquis.

8.  Pulmonary nodules.

 

PLAN:  At this point, the patient will continue acute cares.  He is off the

BiPAP unless he has another decompensation.  We will repeat some venous gases

later today just to see where he is at.  We will have him on IV Lasix.  We will

repeat lab work tomorrow.  We will try to get him up and working with therapies

as he is quite deconditioned.  Anticipate that he may need a skilled nursing

stay.  He is not yet stable for discharge.  I expect that could take another day

or 2.  For DVT prophylaxis, he is on the Eliquis.  He is a code level 3.  He

reconfirmed this with me again this morning.

 

 

MKA:  02/07/2020 12:31:48  MODL:  02/07/2020 13:02:37

Job #:  [id number]

RACHAEL

## 2020-02-07 NOTE — HP
CHIEF COMPLAINT:  Unresponsiveness.

 

HISTORY OF PRESENT ILLNESS:  This is an 84-year-old male with known severe COPD,

who had become more drowsy over the past couple of days.  Most of the history is

obtained from his wife.  The patient himself said he had not been having any

cough.  His wife noticed he was more short of breath with walking like to the

bathroom, so she increased his oxygen to 4 L.  He also has a known history of

heart failure with a preserved ejection fraction.  They had not noticed any

increased swelling.  Did have a prolonged hospitalization in [place] back in

2018.  His EF was down to 40% at that time.  [place] in [place] was in 2019 for a similar presentation.  His CO2 was over 90.  He is

denying any chest pain or new pains.  No fever, no chills.  His white count was

normal in the emergency room.  He was given some IV Solu-Medrol and placed on

BiPAP.  His only recent med changes were his Victoza was discontinued in hopes

that it would help with his itching.  His Lasix instead of 40 mg twice daily was

changed to 80 mg in the morning since he was not routinely taking the 2nd dose.

 

ALLERGIES:  Include none.

 

MEDICATION LIST:  Reviewed and includes prednisone 2.5 mg daily for RA, Lasix 80

mg daily, metolazone 2.5 mg as needed for swelling, NovoLog 70/3,0 24 units

twice daily, folic acid 1 mg daily, iron 325 daily, Toprol 150 mg daily, Eliquis

5 mg twice daily, Atrovent, Pulmicort neb twice daily, Brovana neb twice daily,

sulfasalazine 1000 mg twice daily, vitamin C 1000 daily, Lamisil as needed,

vitamin D 2000 daily, and triamcinolone cream.

 

PAST MEDICAL HISTORY:  Includes:

1. Some chronic anemia, iron deficiency and probably chronic disease.

2. Chronic atrial fibrillation.

3. Chronic diastolic heart failure.  Last EF 40% in 2018.

4. Osteoporosis with previous lumbar compression fractures.

5. Cognitive impairment, especially when ill.  His SLUMS was  back in 2019

    in [place].  However, his mentation improved significantly after he

    recovered from pneumonia.

6. 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

7. COPD, severe with frequent exacerbations with chronic hypoxic respiratory

    failure.

8. Hyperlipidemia.

9. Hypogonadism.

10.Osteoporosis.

11.Rheumatoid arthritis.

12.Pulmonary nodules.

 

PAST SURGICAL HISTORY:  The patient has had a colonoscopy in the past.

 

SOCIAL HISTORY:  He is .  He is a retired farmer.  He lives at home with

his wife.  He quit smoking a few years ago.

 

FAMILY HISTORY:  The patient's parents are both .  He had a brother who

passed away from multiple sclerosis.

 

REVIEW OF SYSTEMS:

General:  The patient is in too much acute respiratory distress to give me good

answers, but his wife did not believe he has had any weight changes.  Otherwise,

no fever, no chills.  There have not been any complaints of headache, sore

throat, or body aches.  Otherwise, all systems reviewed as able with his wife

and found to be negative unless otherwise stated.

 

PHYSICAL EXAMINATION:

Vital signs:  His temperature was 97.8, his pulse 82, blood pressure 121/56,

respiratory rate was 30, O2 of 95% on the 35% FiO2 with the BiPAP.  Initial

respiratory rate into the ER was 36 and he was 94% on his home 4 L.

General:  He is mildly pale.  He is in just mild distress getting off the BiPAP

device as he is more awake and alert and not wanting to wear it, but he was

needing to sit up to breathe, but he was able to speak in short sentences

without being increasingly drowsy or short of breath.

Heart:  Irregularly irregular.

Lungs:  Sounds were decreased throughout, but no wheezing was appreciated.  I

did not appreciate any crackles either.

Abdomen:  Nondistended.  Positive bowel sounds.  Nontender.

Extremities:  Warm and dry.  He had 2+ edema up to his knee.

Mental Status:  He is alert.  He is orientated x3.  He is aware he is at the

hospital.  He recognizes me.  He is able to answer questions about his past

visits.

 

DIAGNOSTIC DATA:  Chest x-ray reviewed, showed some chronic findings.  No new

infiltrates.  Some opacities noted in both lungs, probably atelectasis or

scarring.  His EKG showed AFib.

 

LABORATORY WORK:  Showed him to have white count 7.2, hemoglobin 10.9, which is

near his baseline, platelets 197.  INR 0.9.  pH 7.35, pCO2 of 99, pO2 of 48.

Sodium 140, potassium 4.9, chloride 92, bicarb 46, BUN 15, creatinine 0.9,

glucose 173, magnesium 1.7, AST 13, ALT 13, bilirubin normal, alk phos normal.

Troponin negative.  CRP 3.7.  ProBNP 3454.  TSH 0.7, albumin 3.5.

 

ASSESSMENT:

1. Acute on chronic hypoxic and hypercapnic respiratory failure related to

    chronic obstructive pulmonary disease.  He likely has a COPD exacerbation.

    The patient is on no breathing devices at home.  Could consider a Trilogy

    machine, although he is not really able to tolerate a mask.  He has been

    evaluated in the past for at least considering for continuous positive

    airway pressure, but he has never been on one.  At this point, he is

    admitted.  He is placed on BiPAP 14/4, 35% FiO2 setting.  He seems to be

    tolerating it better now.  We will have some Ativan available as needed for

    anxiety.  Discussed with him and his wife the best idea is just to rest and

    use the BiPAP overnight.  I will repeat an ABG in 2 hours.  He already got

    125 of Solu-Medrol.  I will start him on 40 mg IV b.i.d. tomorrow.  Oral

    doxycycline scheduled and p.r.n. DuoNebs along with his home nebulizers.

2. Acute chronic obstructive pulmonary disease exacerbation with severe

    underlying chronic obstructive pulmonary disease.  He is on the above

    treatments and home nebs.

3. Type 2 diabetes.  I would expect some hyperglycemia from steroids.  We will

    have his home insulin available and adjust as needed.  He will have q.i.d.

    Accu-Cheks.

4. Chronic systolic and diastolic heart failure, EF 40%.  The patient may be

    having just a mild exacerbation, which could be contributing to his

    hypoxia.  We will change his Lasix over to IV and see how he does with

    diuresis.

5. Rheumatoid arthritis.  We will continue his home medications.

6. Anemia.  He is at baseline.

7. Atrial fibrillation, currently rate controlled.  We will monitor with

    telemetry.  He is on the Eliquis.

8. History of pulmonary nodules.  [facility].

 

PLAN:  At this point, the patient is admitted for acute cares for respiratory

failure.  He will be on the BiPAP.  I will repeat the ABG.  I will repeat his

lab work in the morning.  We will have some IV Lasix available 40 mg x1 today

and I will reassess in the morning.  We will do continuous pulse oximetry.  He

is a code level 3.  That is what he has been on past admissions.  His wife

initially was not sure.  She said he said he wanted to live.  He is not in any

cognitive capacity to make drastic decisions now, but we have discussed it in

the office.  Given his severe COPD and comorbidities and he has not wanted to be

intubated on his last admission, he is a code level 3.  We will continue to

discuss that and get him hopefully to do some formal paperwork.  I suspect he

will even be more alert by tomorrow.

 

 

WANDAA:  2020 21:59:18  MODL:  2020 01:11:02

Job #:  [id number]

RACHAEL

## 2020-02-08 NOTE — PCM.PN
- General Info


Date of Service: 02/08/20


Subjective Update: 





83 yo male hospital day #3 admitted for acute on chronic hypercapneic and 

hypoxic respiratory failure secondary to COPD and CHF exacerbation.





Patient states he is doing well today and he would like to go home. Did not 

sleep much last night per nursing. He denies any shortness of breath, cough, 

chest pain, or fever. He states his appetite is good. He states he is having no 

issues with voiding and is having regular bowel movements. He denies any 

concerns apart from wanting to go home.





- Review of Systems


General: Reports: No Symptoms


HEENT: Reports: No Symptoms


Pulmonary: Reports: No Symptoms


Cardiovascular: Reports: No Symptoms


Gastrointestinal: Reports: No Symptoms


Genitourinary: Reports: No Symptoms


Musculoskeletal: Reports: No Symptoms


Skin: Reports: No Symptoms


Neurological: Reports: No Symptoms





- Patient Data


Vitals - Most Recent: 


 Last Vital Signs











Temp  36.1 C   02/08/20 04:29


 


Pulse  125 H  02/08/20 08:16


 


Resp  17   02/08/20 04:29


 


BP  122/78   02/08/20 08:16


 


Pulse Ox  94 L  02/08/20 07:06











Weight - Most Recent: 115.439 kg


I&O - Last 24 Hours: 


 Intake & Output











 02/07/20 02/08/20 02/08/20





 22:59 06:59 14:59


 


Intake Total 570 200 


 


Balance 570 200 











Lab Results Last 24 Hours: 


 Laboratory Results - last 24 hr











  02/07/20 02/07/20 02/07/20 Range/Units





  12:13 13:36 14:08 


 


WBC     (4.0-10.0)  x10^3/uL


 


RBC     (4.5-6.0)  x10^6/uL


 


Hgb     (14.0-18.0)  g/dL


 


Hct     (40.0-52.0)  %


 


MCV     (78.0-93.0)  fL


 


MCH     (26.0-32.0)  pg


 


MCHC     (32.0-36.0)  g/dL


 


RDW Coeff of Brandon     (10.0-15.0)  %


 


Plt Count     (130-400)  x10^3/uL


 


Neut % (Auto)     (50.0-80.0)  %


 


Lymph % (Auto)     (25.0-50.0)  %


 


Mono % (Auto)     (2.0-11.0)  %


 


Eos % (Auto)     (0.0-4.0)  %


 


Baso % (Auto)     (0.2-1.2)  %


 


POC VBG pH     (7.31-7.41)  


 


POC VBG pCO2     (41-51)  


 


POC VBG pO2     


 


POC VBG HCO3     (23-28)  


 


POC VBG Total CO2     (24-29)  


 


POC VBG Base Excess     ((-2) - 3)  


 


POC FiO2     


 


Sodium     (136-145)  mmol/L


 


Potassium     (3.5-5.1)  mmol/L


 


Chloride     ()  mmol/L


 


Carbon Dioxide     (21-32)  mmol/L


 


Anion Gap     (10-20)  mmol/L


 


BUN     (7-18)  mg/dL


 


Creatinine     (0.70-1.30)  mg/dL


 


Est Cr Clr Drug Dosing     mL/min


 


Estimated GFR (MDRD)     


 


Glucose    446 H*  ()  mg/dL


 


POC Glucose  > 500 H*  494 H*   ()  mg/dL


 


Calcium     (8.5-10.1)  mg/dL














  02/07/20 02/07/20 02/08/20 Range/Units





  14:18 20:52 06:00 


 


WBC     (4.0-10.0)  x10^3/uL


 


RBC     (4.5-6.0)  x10^6/uL


 


Hgb     (14.0-18.0)  g/dL


 


Hct     (40.0-52.0)  %


 


MCV     (78.0-93.0)  fL


 


MCH     (26.0-32.0)  pg


 


MCHC     (32.0-36.0)  g/dL


 


RDW Coeff of Brandon     (10.0-15.0)  %


 


Plt Count     (130-400)  x10^3/uL


 


Neut % (Auto)     (50.0-80.0)  %


 


Lymph % (Auto)     (25.0-50.0)  %


 


Mono % (Auto)     (2.0-11.0)  %


 


Eos % (Auto)     (0.0-4.0)  %


 


Baso % (Auto)     (0.2-1.2)  %


 


POC VBG pH  7.45 H    (7.31-7.41)  


 


POC VBG pCO2  67 H    (41-51)  


 


POC VBG pO2  89    


 


POC VBG HCO3  47 H    (23-28)  


 


POC VBG Total CO2  49 H    (24-29)  


 


POC VBG Base Excess  23 H    ((-2) - 3)  


 


POC FiO2  0.32    


 


Sodium     (136-145)  mmol/L


 


Potassium     (3.5-5.1)  mmol/L


 


Chloride     ()  mmol/L


 


Carbon Dioxide     (21-32)  mmol/L


 


Anion Gap     (10-20)  mmol/L


 


BUN     (7-18)  mg/dL


 


Creatinine     (0.70-1.30)  mg/dL


 


Est Cr Clr Drug Dosing     mL/min


 


Estimated GFR (MDRD)     


 


Glucose     ()  mg/dL


 


POC Glucose   321 H  260 H  ()  mg/dL


 


Calcium     (8.5-10.1)  mg/dL














  02/08/20 02/08/20 Range/Units





  09:55 09:55 


 


WBC  5.5   (4.0-10.0)  x10^3/uL


 


RBC  3.56 L   (4.5-6.0)  x10^6/uL


 


Hgb  10.6 L   (14.0-18.0)  g/dL


 


Hct  35.2 L   (40.0-52.0)  %


 


MCV  98.9 H   (78.0-93.0)  fL


 


MCH  29.8   (26.0-32.0)  pg


 


MCHC  30.1 L   (32.0-36.0)  g/dL


 


RDW Coeff of Brandon  13.9   (10.0-15.0)  %


 


Plt Count  207   (130-400)  x10^3/uL


 


Neut % (Auto)  81.2 H   (50.0-80.0)  %


 


Lymph % (Auto)  10.0 L   (25.0-50.0)  %


 


Mono % (Auto)  7.7   (2.0-11.0)  %


 


Eos % (Auto)  0.7   (0.0-4.0)  %


 


Baso % (Auto)  0.4   (0.2-1.2)  %


 


POC VBG pH    (7.31-7.41)  


 


POC VBG pCO2    (41-51)  


 


POC VBG pO2    


 


POC VBG HCO3    (23-28)  


 


POC VBG Total CO2    (24-29)  


 


POC VBG Base Excess    ((-2) - 3)  


 


POC FiO2    


 


Sodium   137  (136-145)  mmol/L


 


Potassium   4.1  (3.5-5.1)  mmol/L


 


Chloride   89 L  ()  mmol/L


 


Carbon Dioxide   43 H  (21-32)  mmol/L


 


Anion Gap   9.1 L  (10-20)  mmol/L


 


BUN   26 H  (7-18)  mg/dL


 


Creatinine   1.3  (0.70-1.30)  mg/dL


 


Est Cr Clr Drug Dosing   38.17  mL/min


 


Estimated GFR (MDRD)   53  


 


Glucose   378 H  ()  mg/dL


 


POC Glucose    ()  mg/dL


 


Calcium   9.3  (8.5-10.1)  mg/dL











Pilo Results Last 24 Hours: 


 Microbiology











 02/06/20 13:06 Aerobic Blood Culture - Preliminary





 Blood - Venous - Lab Draw    NO GROWTH AFTER 1 DAY





 Anaerobic Blood Culture - Final


 


 02/06/20 13:00 Aerobic Blood Culture - Preliminary





 Blood - Venous    NO GROWTH AFTER 1 DAY





 Anaerobic Blood Culture - Final











Med Orders - Current: 


 Current Medications





Albuterol/Ipratropium (Duoneb 3.0-0.5 Mg/3 Ml)  3 ml NEB Q4H PRN


   PRN Reason: dyspnea/wheezing


Albuterol/Ipratropium (Duoneb 3.0-0.5 Mg/3 Ml)  3 ml NEB Q4HRRT [facility]


   Last Admin: 02/08/20 06:57 Dose:  3 ml


Apixaban (Eliquis)  5 mg PO BID [facility]


   Last Admin: 02/08/20 08:19 Dose:  5 mg


Arformoterol Tartrate (Brovana)  15 mcg NEB BIDRT [facility]


   Last Admin: 02/08/20 06:56 Dose:  15 mcg


Budesonide (Pulmicort)  0.5 mg NEB BID [facility]


   Last Admin: 02/08/20 07:05 Dose:  0.5 mg


Doxycycline Hyclate (Vibramycin)  100 mg PO BID [facility]


   Last Admin: 02/08/20 08:20 Dose:  100 mg


Folic Acid (Folic Acid)  1 mg PO DAILY [facility]


   Last Admin: 02/08/20 08:20 Dose:  1 mg


Insulin Lispro Protam/Lispro Human (Humalog Mix 75-25)  24 unit SUBCUT BIDMEALS 

[facility]


   Last Admin: 02/08/20 08:15 Dose:  24 unit


Loratadine (Claritin)  10 mg PO DAILY [facility]


   Last Admin: 02/08/20 08:20 Dose:  10 mg


Lorazepam (Ativan)  0.5 mg IVPUSH Q4H PRN


   PRN Reason: Anxiety


   Last Admin: 02/07/20 02:35 Dose:  0.5 mg


Methylprednisolone Sodium Succinate (Solu-Medrol)  40 mg IVPUSH DAILY [facility]


   Last Admin: 02/08/20 08:15 Dose:  40 mg


Metoprolol Succinate (Toprol Xl)  150 mg PO DAILY [facility]


   Last Admin: 02/08/20 08:16 Dose:  150 mg


Ondansetron HCl (Zofran)  4 mg IV Q4H PRN


   PRN Reason: Nausea/Vomiting


Sulfasalazine (Sulfasalazine Dr)  1,000 mg PO BID [facility]


   Last Admin: 02/08/20 08:19 Dose:  1,000 mg





Discontinued Medications





Furosemide (Lasix)  40 mg IV ONETIME ONE


   Stop: 02/06/20 14:46


   Last Admin: 02/06/20 15:09 Dose:  40 mg


Furosemide (Lasix)  40 mg IV BIDDIURETIC [facility]


   Last Admin: 02/08/20 08:15 Dose:  40 mg


Magnesium Sulfate 2 gm/ Premix  50 mls @ 25 mls/hr IV ONETIME ONE


   Stop: 02/06/20 16:33


   Last Admin: 02/06/20 15:09 Dose:  25 mls/hr


Insulin Human Lispro (Humalog)  10 unit SUBCUT ONETIME ONE


   Stop: 02/07/20 12:25


   Last Admin: 02/07/20 12:49 Dose:  10 unit


Insulin Lispro Protam/Lispro Human (Humalog Mix 75-25)  10 unit SUBCUT DAILY ONE


   Stop: 02/06/20 18:44


   Last Admin: 02/06/20 18:52 Dose:  10 unit


Methylprednisolone Sodium Succinate (Solu-Medrol)  125 mg IM ONETIME ONE


   Stop: 02/06/20 12:53


   Last Admin: 02/06/20 13:03 Dose:  125 mg


Methylprednisolone Sodium Succinate (Solu-Medrol)  40 mg IVPUSH Q12H [facility]


   Last Admin: 02/07/20 06:00 Dose:  40 mg











- Exam


General: Alert, Cooperative, No Acute Distress


HEENT: Mucous Membr. Moist/Pink


Neck: Supple, Trachea Midline, No Thyromegaly.  No: Lymphadenopathy


Lungs: Normal Respiratory Effort, Wheezing (throughout both lung fields 

bilaterally).  No: Crackles


Cardiovascular: No Murmurs, Irregular Rhythm, Tachycardia


GI/Abdominal Exam: Normal Bowel Sounds, Soft, Non-Tender, No Organomegaly, No 

Distention, No Mass


Extremities: Normal Inspection, Non-Tender, Pedal Edema (trace bilateral)


Peripheral Pulses: 2+: Radial (L), Radial (R)


Skin: Warm, Dry, Intact





Sepsis Event Note





- Evaluation


Sepsis Screening Result: No Definite Risk





- Focused Exam


Vital Signs: 


 Vital Signs











  Temp Pulse Pulse Resp BP BP Pulse Ox


 


 02/08/20 08:16   125 H    122/78  


 


 02/08/20 07:06       


 


 02/08/20 06:00        97


 


 02/08/20 04:29  36.1 C   113 H  17   125/58 L  96


 


 02/08/20 02:00        95


 


 02/08/20 01:59  36.2 C   113 H  18   129/98 H  96














  Pulse Ox


 


 02/08/20 08:16 


 


 02/08/20 07:06  94 L


 


 02/08/20 06:00 


 


 02/08/20 04:29 


 


 02/08/20 02:00 


 


 02/08/20 01:59 











Date Exam was Performed: 02/08/20


Time Exam was Performed: 10:39





- Problem List & Annotations


(1) Respiratory failure


SNOMED Code(s): 963345191


   Code(s): J96.90 - RESPIRATORY FAILURE, UNSP, UNSP W HYPOXIA OR HYPERCAPNIA   

Status: Acute   Current Visit: Yes   


Qualifiers: 


   Chronicity: acute on chronic   Respiratory failure complication: hypoxia and 

hypercapnia   Qualified Code(s): J96.21 - Acute and chronic respiratory failure 

with hypoxia; J96.22 - Acute and chronic respiratory failure with hypercapnia   





(2) CHF, acute on chronic


SNOMED Code(s): 526131613, 27916063759864


   Code(s): I50.9 - HEART FAILURE, UNSPECIFIED   Status: Acute   Priority: High

   Current Visit: Yes   


Qualifiers: 


   Heart failure type: combined systolic and diastolic   Qualified Code(s): 

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

congestive) heart failure   





(3) COPD with acute exacerbation


SNOMED Code(s): 814453471


   Code(s): J44.1 - CHRONIC OBSTRUCTIVE PULMONARY DISEASE W (ACUTE) 

EXACERBATION   Status: Acute   Current Visit: Yes   





(4) Atrial fibrillation


SNOMED Code(s): 06447626


   Code(s): I48.91 - UNSPECIFIED ATRIAL FIBRILLATION   Status: Chronic   

Priority: Medium   Current Visit: No   


Qualifiers: 


   Atrial fibrillation type: paroxysmal   Qualified Code(s): I48.0 - Paroxysmal 

atrial fibrillation   





(5) Diabetes


SNOMED Code(s): 93668166


   Code(s): E11.9 - TYPE 2 DIABETES MELLITUS WITHOUT COMPLICATIONS   Status: 

Chronic   Priority: High   Current Visit: No   


Qualifiers: 


   Diabetes mellitus type: type 2   Diabetes mellitus long term insulin use: 

with long term use   Diabetes mellitus complication status: with hyperglycemia 

  Qualified Code(s): E11.65 - Type 2 diabetes mellitus with hyperglycemia; 

Z79.4 - Long term (current) use of insulin   





(6) Rheumatoid arthritis


SNOMED Code(s): 39886574


   Code(s): M06.9 - RHEUMATOID ARTHRITIS, UNSPECIFIED   Status: Chronic   

Current Visit: Yes   


Qualifiers: 


   Rheumatoid arthritis location: multiple sites   Rheumatoid factor presence: 

unspecified presence   Qualified Code(s): M06.9 - Rheumatoid arthritis, 

unspecified   





(7) Anemia


SNOMED Code(s): 985812769


   Code(s): D64.9 - ANEMIA, UNSPECIFIED   Status: Chronic   Current Visit: Yes 

  


Qualifiers: 


   Anemia type: unspecified type   Qualified Code(s): D64.9 - Anemia, 

unspecified   





(8) Hypertension


SNOMED Code(s): 37301612


   Code(s): I10 - ESSENTIAL (PRIMARY) HYPERTENSION   Status: Chronic   Current 

Visit: No   


Qualifiers: 


   Hypertension type: essential hypertension   Qualified Code(s): I10 - 

Essential (primary) hypertension   





- Problem List Review


Problem List Initiated/Reviewed/Updated: Yes





- Assessment


Assessment:: 





83 yo male admitted with acute on chronic respiratory failure secondary to COPD 

and CHF exacerbation. Doing much better today and states he is at his baseline. 

Has not been out of bed much but is not very mobile at baseline. Labs stable 

apart from creatinine, which is gradually increasing.





- Plan


Plan:: 





#1 Acute on chronic hypoxic and hypercapneic respiratory failure, secondary to #

2 and #3





- Patient has been off BiPap for 24 hours and has not had any decompensation. 

CO2 yesterday afternoon improved from am despite being off BiPap.


- He is still requiring slightly more than his usual home oxygen (3L instead of 

2L) but this will be weaned as able. Target O2 saturations are 88-92%.


- Specific treatment otherwise as below.





#2 CHF exacerbation





- Weight is down significantly from admit but is still higher than his 

discharge weight with his last hospitalization. That being said, his HR is up 

today as is his creatinine.


- Therefore, will d/c IV lasix today.


- Will recheck weight tomorrow and determine if he can resume the dose he is 

supposed to be on at home (40 mg BID) or whether he needs higher PO dosing (as 

in 80 mg in am and 40 mg in afternoon) for a couple of days.


- Recheck labs in the morning as well.





#3 COPD exacerbation





- Continue scheduled DuoNebs.


- He got a dose of IV solu-medrol this morning. As long as he tolerates not 

getting a second dose later today, will transition to prednisone tomorrow.


- Continue doxycycline.





#4 Atrial Fibrillation





- HR higher this morning, which is likely more related to the fact that he 

wants to go home and cannot yet as well as slightly too rapid diuresis rather 

than true uncontrolled rates.


- Hgb stable.


- Will monitor throughout the day today and consider an extra dose of 

metoprolol if his HR remains elevated above 120.





#5 DM type 2





- Glucoses have been higher with the steroids but are improved today compared 

to yesterday with the dose reduction.


- No changes to his insulin today. Will consider addition of sliding scale if 

his glucoses do not continue to improve with tapering steroid dosing.





#6 RA


#7 Anemia


#8 Hypertension





- Hgb stable.


- Continue home medications.





Patient will remain on acute status today - anticipate he will be medically 

prepared for discharge from acute in the next 24-48 hours. Will see how he 

ambulates with nursing and consider PT/OT evaluation for swing bed on Monday 

depending on how that goes. It is unclear whether he would be agreeable to 

this. See details under plan above. Patient is on eliquis - no other VTE 

prophylaxis needed. Patient is DNR/DNI - confirmed by PCP with his wife on 

admission and then with him yesterday morning when he was more alert.

## 2020-02-09 NOTE — PCM.PN
- General Info


Date of Service: 02/09/20


Subjective Update: 





83 yo male hospital day #4 admitted with acute on chronic hypoxic and 

hypercapneic respiratory failure secondary to COPD and CHF exacerbation.





Continues to feel like he is at his baseline. He states he is short of breath 

but at his usual. He was able to walk in the hallways yesterday afternoon but 

nursing noted he did struggle with this due to dyspnea. No chest pain or cough. 

No fever. He is eating well.





- Review of Systems


General: Reports: No Symptoms


HEENT: Reports: No Symptoms


Pulmonary: Reports: Shortness of Breath.  Denies: Cough


Cardiovascular: Reports: No Symptoms


Gastrointestinal: Reports: No Symptoms


Genitourinary: Reports: No Symptoms


Musculoskeletal: Reports: No Symptoms


Skin: Reports: No Symptoms


Neurological: Reports: No Symptoms





- Patient Data


Vitals - Most Recent: 


 Last Vital Signs











Temp  36.3 C   02/09/20 04:38


 


Pulse  122 H  02/09/20 08:22


 


Resp  19   02/09/20 04:38


 


BP  149/93 H  02/09/20 08:22


 


Pulse Ox  95   02/09/20 07:11











Weight - Most Recent: 115.439 kg


I&O - Last 24 Hours: 


 Intake & Output











 02/08/20 02/09/20 02/09/20





 22:59 06:59 14:59


 


Intake Total  200 300


 


Output Total 400  


 


Balance -400 200 300











Lab Results Last 24 Hours: 


 Laboratory Results - last 24 hr











  02/08/20 02/08/20 02/08/20 Range/Units





  06:00 09:55 09:55 


 


WBC   5.5   (4.0-10.0)  x10^3/uL


 


RBC   3.56 L   (4.5-6.0)  x10^6/uL


 


Hgb   10.6 L   (14.0-18.0)  g/dL


 


Hct   35.2 L   (40.0-52.0)  %


 


MCV   98.9 H   (78.0-93.0)  fL


 


MCH   29.8   (26.0-32.0)  pg


 


MCHC   30.1 L   (32.0-36.0)  g/dL


 


RDW Coeff of Brandon   13.9   (10.0-15.0)  %


 


Plt Count   207   (130-400)  x10^3/uL


 


Neut % (Auto)   81.2 H   (50.0-80.0)  %


 


Lymph % (Auto)   10.0 L   (25.0-50.0)  %


 


Mono % (Auto)   7.7   (2.0-11.0)  %


 


Eos % (Auto)   0.7   (0.0-4.0)  %


 


Baso % (Auto)   0.4   (0.2-1.2)  %


 


Sodium    137  (136-145)  mmol/L


 


Potassium    4.1  (3.5-5.1)  mmol/L


 


Chloride    89 L  ()  mmol/L


 


Carbon Dioxide    43 H  (21-32)  mmol/L


 


Anion Gap    9.1 L  (10-20)  mmol/L


 


BUN    26 H  (7-18)  mg/dL


 


Creatinine    1.3  (0.70-1.30)  mg/dL


 


Est Cr Clr Drug Dosing    38.17  mL/min


 


Estimated GFR (MDRD)    53  


 


Glucose    378 H  ()  mg/dL


 


POC Glucose  260 H    ()  mg/dL


 


Calcium    9.3  (8.5-10.1)  mg/dL














  02/08/20 02/08/20 02/08/20 Range/Units





  10:38 17:22 20:32 


 


WBC     (4.0-10.0)  x10^3/uL


 


RBC     (4.5-6.0)  x10^6/uL


 


Hgb     (14.0-18.0)  g/dL


 


Hct     (40.0-52.0)  %


 


MCV     (78.0-93.0)  fL


 


MCH     (26.0-32.0)  pg


 


MCHC     (32.0-36.0)  g/dL


 


RDW Coeff of Brandon     (10.0-15.0)  %


 


Plt Count     (130-400)  x10^3/uL


 


Neut % (Auto)     (50.0-80.0)  %


 


Lymph % (Auto)     (25.0-50.0)  %


 


Mono % (Auto)     (2.0-11.0)  %


 


Eos % (Auto)     (0.0-4.0)  %


 


Baso % (Auto)     (0.2-1.2)  %


 


Sodium     (136-145)  mmol/L


 


Potassium     (3.5-5.1)  mmol/L


 


Chloride     ()  mmol/L


 


Carbon Dioxide     (21-32)  mmol/L


 


Anion Gap     (10-20)  mmol/L


 


BUN     (7-18)  mg/dL


 


Creatinine     (0.70-1.30)  mg/dL


 


Est Cr Clr Drug Dosing     mL/min


 


Estimated GFR (MDRD)     


 


Glucose     ()  mg/dL


 


POC Glucose  389 H  339 H  275 H  ()  mg/dL


 


Calcium     (8.5-10.1)  mg/dL














  02/09/20 02/09/20 02/09/20 Range/Units





  06:19 07:31 07:31 


 


WBC   4.1   (4.0-10.0)  x10^3/uL


 


RBC   3.80 L   (4.5-6.0)  x10^6/uL


 


Hgb   11.4 L   (14.0-18.0)  g/dL


 


Hct   37.2 L   (40.0-52.0)  %


 


MCV   97.9 H   (78.0-93.0)  fL


 


MCH   30.0   (26.0-32.0)  pg


 


MCHC   30.6 L   (32.0-36.0)  g/dL


 


RDW Coeff of Brandon   13.7   (10.0-15.0)  %


 


Plt Count   228   (130-400)  x10^3/uL


 


Neut % (Auto)   56.1   (50.0-80.0)  %


 


Lymph % (Auto)   27.1   (25.0-50.0)  %


 


Mono % (Auto)   14.3 H   (2.0-11.0)  %


 


Eos % (Auto)   2.0   (0.0-4.0)  %


 


Baso % (Auto)   0.5   (0.2-1.2)  %


 


Sodium    142  (136-145)  mmol/L


 


Potassium    3.7  (3.5-5.1)  mmol/L


 


Chloride    94 L  ()  mmol/L


 


Carbon Dioxide    43 H  (21-32)  mmol/L


 


Anion Gap    8.7 L  (10-20)  mmol/L


 


BUN    23 H  (7-18)  mg/dL


 


Creatinine    0.9  (0.70-1.30)  mg/dL


 


Est Cr Clr Drug Dosing    55.14  mL/min


 


Estimated GFR (MDRD)    > 60  


 


Glucose    133 H  ()  mg/dL


 


POC Glucose  101    ()  mg/dL


 


Calcium    9.3  (8.5-10.1)  mg/dL











Pilo Results Last 24 Hours: 


 Microbiology











 02/06/20 13:06 Aerobic Blood Culture - Preliminary





 Blood - Venous - Lab Draw    NO GROWTH AFTER 2 DAYS





 Anaerobic Blood Culture - Final


 


 02/06/20 13:00 Aerobic Blood Culture - Preliminary





 Blood - Venous    NO GROWTH AFTER 2 DAYS





 Anaerobic Blood Culture - Final











Med Orders - Current: 


 Current Medications





Albuterol/Ipratropium (Duoneb 3.0-0.5 Mg/3 Ml)  3 ml NEB Q4H PRN


   PRN Reason: dyspnea/wheezing


Albuterol/Ipratropium (Duoneb 3.0-0.5 Mg/3 Ml)  3 ml NEB Q4HRRT [facility]


   Last Admin: 02/09/20 06:15 Dose:  3 ml


Apixaban (Eliquis)  5 mg PO BID [facility]


   Last Admin: 02/09/20 08:22 Dose:  5 mg


Arformoterol Tartrate (Brovana)  15 mcg NEB BIDRT [facility]


   Last Admin: 02/09/20 06:15 Dose:  15 mcg


Budesonide (Pulmicort)  0.5 mg NEB BID [facility]


   Last Admin: 02/09/20 07:10 Dose:  0.5 mg


Doxycycline Hyclate (Vibramycin)  100 mg PO BID [facility]


   Last Admin: 02/09/20 08:21 Dose:  100 mg


Folic Acid (Folic Acid)  1 mg PO DAILY [facility]


   Last Admin: 02/09/20 08:22 Dose:  1 mg


Furosemide (Lasix)  40 mg PO BIDDIURETIC [facility]


Insulin Lispro Protam/Lispro Human (Humalog Mix 75-25)  24 unit SUBCUT BIDMEALS 

[facility]


   Last Admin: 02/09/20 08:21 Dose:  24 unit


Loratadine (Claritin)  10 mg PO DAILY [facility]


   Last Admin: 02/09/20 08:22 Dose:  10 mg


Lorazepam (Ativan)  0.5 mg IVPUSH Q4H PRN


   PRN Reason: Anxiety


   Last Admin: 02/07/20 02:35 Dose:  0.5 mg


Metoprolol Succinate (Toprol Xl)  150 mg PO DAILY [facility]


   Last Admin: 02/09/20 08:22 Dose:  150 mg


Ondansetron HCl (Zofran)  4 mg IV Q4H PRN


   PRN Reason: Nausea/Vomiting


Prednisone (Prednisone)  40 mg PO WITHBREAKFAST [facility]


Sulfasalazine (Sulfasalazine Dr)  1,000 mg PO BID [facility]


   Last Admin: 02/09/20 08:21 Dose:  1,000 mg





Discontinued Medications





Furosemide (Lasix)  40 mg IV ONETIME ONE


   Stop: 02/06/20 14:46


   Last Admin: 02/06/20 15:09 Dose:  40 mg


Furosemide (Lasix)  40 mg IV BIDDIURETIC [facility]


   Last Admin: 02/08/20 08:15 Dose:  40 mg


Magnesium Sulfate 2 gm/ Premix  50 mls @ 25 mls/hr IV ONETIME ONE


   Stop: 02/06/20 16:33


   Last Admin: 02/06/20 15:09 Dose:  25 mls/hr


Insulin Human Lispro (Humalog)  10 unit SUBCUT ONETIME ONE


   Stop: 02/07/20 12:25


   Last Admin: 02/07/20 12:49 Dose:  10 unit


Insulin Lispro Protam/Lispro Human (Humalog Mix 75-25)  10 unit SUBCUT DAILY ONE


   Stop: 02/06/20 18:44


   Last Admin: 02/06/20 18:52 Dose:  10 unit


Methylprednisolone Sodium Succinate (Solu-Medrol)  125 mg IM ONETIME ONE


   Stop: 02/06/20 12:53


   Last Admin: 02/06/20 13:03 Dose:  125 mg


Methylprednisolone Sodium Succinate (Solu-Medrol)  40 mg IVPUSH Q12H [facility]


   Last Admin: 02/07/20 06:00 Dose:  40 mg


Methylprednisolone Sodium Succinate (Solu-Medrol)  40 mg IVPUSH DAILY [facility]


   Last Admin: 02/08/20 08:15 Dose:  40 mg











- Exam


General: Alert, Cooperative, No Acute Distress


HEENT: Mucous Membr. Moist/Pink


Neck: Supple, Trachea Midline, No Thyromegaly.  No: Lymphadenopathy


Lungs: Normal Respiratory Effort, Wheezing (bilaterally but less prominent than 

yesterday)


Cardiovascular: Regular Rate, No Murmurs, Irregular Rhythm


GI/Abdominal Exam: Normal Bowel Sounds, Soft, Non-Tender, No Organomegaly, No 

Distention, No Mass


Extremities: Normal Inspection, Non-Tender, Pedal Edema (trace bilaterally just 

at the ankles)


Peripheral Pulses: 2+: Radial (L), Radial (R)


Skin: Warm, Dry, Intact


Neurological: No New Focal Deficit





Sepsis Event Note





- Evaluation


Sepsis Screening Result: No Definite Risk





- Focused Exam


Vital Signs: 


 Vital Signs











  Temp Pulse Pulse Resp BP BP Pulse Ox


 


 02/09/20 08:22   122 H    149/93 H  


 


 02/09/20 07:11       


 


 02/09/20 05:01        98


 


 02/09/20 04:38  36.3 C   84  19   149/93 H  98


 


 02/09/20 02:00  36.4 C   87  18   131/71  95


 


 02/08/20 22:00        98


 


 02/08/20 21:57  36.1 C   98  17   139/71  96














  Pulse Ox


 


 02/09/20 08:22 


 


 02/09/20 07:11  95


 


 02/09/20 05:01 


 


 02/09/20 04:38 


 


 02/09/20 02:00 


 


 02/08/20 22:00 


 


 02/08/20 21:57 











Date Exam was Performed: 02/09/20


Time Exam was Performed: 08:50





- Problem List & Annotations


(1) Respiratory failure


SNOMED Code(s): 942025304


   Code(s): J96.90 - RESPIRATORY FAILURE, UNSP, UNSP W HYPOXIA OR HYPERCAPNIA   

Status: Acute   Current Visit: Yes   


Qualifiers: 


   Chronicity: acute on chronic   Respiratory failure complication: hypoxia and 

hypercapnia   Qualified Code(s): J96.21 - Acute and chronic respiratory failure 

with hypoxia; J96.22 - Acute and chronic respiratory failure with hypercapnia   





(2) CHF, acute on chronic


SNOMED Code(s): 225936065, 06792331487034


   Code(s): I50.9 - HEART FAILURE, UNSPECIFIED   Status: Acute   Priority: High

   Current Visit: Yes   


Qualifiers: 


   Heart failure type: combined systolic and diastolic   Qualified Code(s): 

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

congestive) heart failure   





(3) COPD with acute exacerbation


SNOMED Code(s): 274144676


   Code(s): J44.1 - CHRONIC OBSTRUCTIVE PULMONARY DISEASE W (ACUTE) 

EXACERBATION   Status: Acute   Current Visit: Yes   





(4) Atrial fibrillation


SNOMED Code(s): 70862077


   Code(s): I48.91 - UNSPECIFIED ATRIAL FIBRILLATION   Status: Chronic   

Priority: Medium   Current Visit: No   


Qualifiers: 


   Atrial fibrillation type: paroxysmal   Qualified Code(s): I48.0 - Paroxysmal 

atrial fibrillation   





(5) Diabetes


SNOMED Code(s): 35908938


   Code(s): E11.9 - TYPE 2 DIABETES MELLITUS WITHOUT COMPLICATIONS   Status: 

Chronic   Priority: High   Current Visit: No   


Qualifiers: 


   Diabetes mellitus type: type 2   Diabetes mellitus long term insulin use: 

with long term use   Diabetes mellitus complication status: with hyperglycemia 

  Qualified Code(s): E11.65 - Type 2 diabetes mellitus with hyperglycemia; 

Z79.4 - Long term (current) use of insulin   





(6) Rheumatoid arthritis


SNOMED Code(s): 32628215


   Code(s): M06.9 - RHEUMATOID ARTHRITIS, UNSPECIFIED   Status: Chronic   

Current Visit: Yes   


Qualifiers: 


   Rheumatoid arthritis location: multiple sites   Rheumatoid factor presence: 

unspecified presence   Qualified Code(s): M06.9 - Rheumatoid arthritis, 

unspecified   





(7) Anemia


SNOMED Code(s): 287205417


   Code(s): D64.9 - ANEMIA, UNSPECIFIED   Status: Chronic   Current Visit: Yes 

  


Qualifiers: 


   Anemia type: unspecified type   Qualified Code(s): D64.9 - Anemia, 

unspecified   





(8) Hypertension


SNOMED Code(s): 59617293


   Code(s): I10 - ESSENTIAL (PRIMARY) HYPERTENSION   Status: Chronic   Current 

Visit: No   


Qualifiers: 


   Hypertension type: essential hypertension   Qualified Code(s): I10 - 

Essential (primary) hypertension   





- Problem List Review


Problem List Initiated/Reviewed/Updated: Yes





- My Orders


Last 24 Hours: 


My Active Orders





02/09/20 08:49


Furosemide [Lasix]   40 mg PO BIDDIURETIC 





02/09/20 08:50


predniSONE   40 mg PO WITHBREAKFAST 





02/10/20 05:11


BASIC METABOLIC PANEL,BMP [CHEM] Routine 


CBC WITH AUTO DIFF [HEME] Routine 














- Assessment


Assessment:: 





83 yo male admitted with acute on chronic respiratory failure secondary to COPD 

and CHF exacerbation. Doing better today but still quite dyspneic with 

exertion. Labs all stable or improved today.





- Plan


Plan:: 





#1 Acute on chronic hypoxic and hypercapneic respiratory failure, secondary to #

2 and #3





- Patient has been off BiPap for 48 hours and has not had any decompensation. 


- He is requiring slightly more than his usual home oxygen (3-4L instead of 2L) 

but this will be weaned as able. Target O2 saturations are 88-92%.


- Specific treatment otherwise as below.





#2 CHF exacerbation





- Weight not available yet today.


- However, given clinical improvement and decrease in creatinine today, will do 

lasix 40 mg PO BID today and reassess tomorrow am.


- Recheck labs in the morning as well.





#3 COPD exacerbation





- Continue scheduled DuoNebs.


- Will transition to prednisone today. 


- Continue doxycycline.





#4 Atrial Fibrillation





- HR overall controlled.


- Home medications continued.





#5 DM type 2





- Glucoses have been higher with the steroids but continue to improve with 

steroid dose reductions.


- No changes to his insulin today. Will consider addition of sliding scale if 

his glucoses do not continue to improve with tapering steroid dosing.





#6 RA


#7 Anemia


#8 Hypertension





- Hgb stable.


- Continue home medications.





Patient will remain on acute status today - anticipate he will be medically 

prepared for discharge from acute tomorrow. He will have PT and OT evaluations 

in the am and then we will determine if he will transition to swing bed or if 

he will be discharged to home. Otherwise see details under plan above. Patient 

is on eliquis - no other VTE prophylaxis needed. Patient is DNR/DNI - confirmed 

by PCP with his wife on admission and then with him yesterday morning when he 

was more alert.

## 2020-02-10 NOTE — [id number].[id number]
**Discharge Summary





- Hospital Course


Free Text/Narrative:: 





Discharge Diagnoses:


-COPD, exacerbation; oxygendependent


-CHF, chronic


-Diabetes type 2, insulin Rx


-Chronic atrial fibrillation





Secondary Diagnoses:


-Obesity


-Rheumatoid arthritis, steroid Rx





Reason for Admission:


Hx of COPD, on home oxygen.  Hx of CHF with previous EF of 40%, HX  Hosp 8/19.  

Hx chronic atrial fibrillation, on Eliquis.  Presented to ER with lethargy, 

exertional dyspnea, hypoxia.  No fever or chills or cough.





Initial Findings:


CXR shows only chronic changes of COPD.  Atrial fib on EKG.  WBC 7200, Hgb 

10.9.  ABGs show pH 7.35, PO2 48, PCO2 99.  Na+ 140, K+ 4.9, BUN 15, creatinine 

0.9, glucose 173, Mg++ 1.7, ALT 13.  Troponin negative.  ProBNP 3454.  TSH 0.7.

  Dyspneic, lethargic, diminished breath sounds and no wheezing heard.  

Oriented and able to give Hx.





Treatment and Course in Hospital:


He was started on BiPAP, oral doxycycline.  Lasix changed from 40 mg  BIDto 80 

mg daily.  He tolerated the BiPAP poorly but soon his PCO2 started to drop.  He 

remained afebrile.  His glucose levels were 200, 448, 379, 202, and at the time 

of discharge 328.  He was continued on NovoLog mix 70/30, 24 units BID,, but at 

D/C increased to 30 units a.m. and 24 units evening.  Repeat BMP on 2/10/20 

remained normal except for the glucose.  He continued on Eliquis for the atrial 

fibrillation, and without the addition of any other diuretic, just getting 

Lasix IV for a while before switching back to oral, at 80 mg daily, he lost 20# 

and breathing better.  He continued to eat fairly well, oxygen weaned back to 2L

/min.





He is somewhat anxious to go home, but still not walking well even with his 

walker, still somewhat dyspneic and therefore agrees to continuing PT in Swing 

Bed until he is strong enough to go home.





Condition on Discharge:


-Oximetry 93% on 2L/min oxygen.  Alert and oriented and not overtly dyspneic


-Lung sounds coarse with a few scattered coarse wheezes but no fine wheezes


-Heart rate about 100, very irregular





Discharge Plan:


-Per his request, DNR/DNI status


-Continue doxycycline 3 more days


-Continue to work with PT in getting to ambulate better with his walker


-NovoLog mix 70/30 increased to 30 units a.m., 24 units evening


-Same medications otherwise





Diagnosis: Stroke: No


Modified [place] Scale: Mod.Disablility Requiring Some Help,Able to Walk Without 

Assistance


Modified Angus Scale Score: 3





- Discharge Data


Discharge Date: 02/10/20


Discharge Disposition: DC/Tfer W/I Hosp To Swing 61


Condition: Fair





- Referral to Home Health


Primary Care Physician: 


Chayito Peterson DO








- Patient Summary/Data


Consults: 


 Consultations





02/06/20 21:59


Consult to Case Management/ [CONS] Routine 





02/07/20 08:28


PT Evaluation and Treatment [CONS] Routine 














- Discharge Plan


*PRESCRIPTION DRUG MONITORING PROGRAM REVIEWED*: Not Applicable


*COPY OF PRESCRIPTION DRUG MONITORING REPORT IN PATIENT DINA: Not Applicable


Home Medications: 


 Home Meds





Ergocalciferol (Vitamin D2) [Vitamin D2] 2,000 unit PO DAILY 05/15/18 [History]


Metoprolol Succinate [Toprol XL 50mg] 150 mg PO DAILY #90 tab.er 05/24/18 [Rx]


Budesonide [Pulmicort] 0.5 mg NEB BID 08/08/18 [History]


Ferrous Sulfate 325 mg PO DAILY 08/09/18 [History]


Cetirizine [ZyrTEC] 10 mg PO DAILY 08/30/19 [History]


Folic Acid 1 mg PO DAILY 08/30/19 [History]


Furosemide [Lasix] 40 mg PO 1600 08/30/19 [History]


Insuln Asp Prot/Insulin Aspart [NovoLOG Mix 70-30] 24 units SQ BIDMEALS 08/30/ 19 [History]


sulfaSALAzine [Azulfidine] 1,000 mg PO BID 08/30/19 [History]


Albuterol/Ipratropium [DuoNeb 3.0-0.5 MG/3 ML] 3 ml NEB Q4H PRN  neb 02/10/20 [

Rx]


Albuterol/Ipratropium [DuoNeb 3.0-0.5 MG/3 ML] 3 ml NEB Q4HRRT  neb 02/10/20 [Rx

]


Apixaban [Eliquis] 5 mg PO BID  tablet 02/10/20 [Rx]


Arformoterol [Brovana] 15 mcg NEB BIDRT  neb 02/10/20 [Rx]


Doxycycline [Vibramycin] 100 mg PO BID 3 Days  cap 02/10/20 [Rx]


Furosemide [Lasix] 40 mg PO DAILY 02/10/20 [History]


predniSONE 40 mg PO WITHBREAKFAST  tablet 02/10/20 [Rx]








Forms:  ED Department Discharge


Referrals: 


Chayito Peterson,  [Primary Care Provider] - 





- Discharge Summary/Plan Comment


DC Time >30 min.: No





- Patient Data


Vitals - Most Recent: 


 Last Vital Signs











Temp  36.1 C   02/10/20 05:02


 


Pulse  107 H  02/10/20 07:52


 


Resp  18   02/10/20 05:02


 


BP  114/66   02/10/20 07:52


 


Pulse Ox  95   02/10/20 08:15











Weight - Most Recent: 106.594 kg


I&O - Last 24 hours: 


 Intake & Output











 02/09/20 02/10/20 02/10/20





 22:59 06:59 14:59


 


Intake Total  300 240


 


Balance  300 240











Lab Results - Last 24 hrs: 


 Laboratory Results - last 24 hr











  02/09/20 02/09/20 02/10/20 Range/Units





  17:28 20:18 06:20 


 


WBC    4.8  (4.0-10.0)  x10^3/uL


 


RBC    3.65 L  (4.5-6.0)  x10^6/uL


 


Hgb    10.9 L  (14.0-18.0)  g/dL


 


Hct    35.4 L  (40.0-52.0)  %


 


MCV    97.0 H  (78.0-93.0)  fL


 


MCH    29.9  (26.0-32.0)  pg


 


MCHC    30.8 L  (32.0-36.0)  g/dL


 


RDW Coeff of Brandon    13.8  (10.0-15.0)  %


 


Plt Count    250  (130-400)  x10^3/uL


 


Neut % (Auto)    62.7  (50.0-80.0)  %


 


Lymph % (Auto)    23.3 L  (25.0-50.0)  %


 


Mono % (Auto)    11.7 H  (2.0-11.0)  %


 


Eos % (Auto)    1.9  (0.0-4.0)  %


 


Baso % (Auto)    0.4  (0.2-1.2)  %


 


Sodium     (136-145)  mmol/L


 


Potassium     (3.5-5.1)  mmol/L


 


Chloride     ()  mmol/L


 


Carbon Dioxide     (21-32)  mmol/L


 


Anion Gap     (10-20)  mmol/L


 


BUN     (7-18)  mg/dL


 


Creatinine     (0.70-1.30)  mg/dL


 


Est Cr Clr Drug Dosing     mL/min


 


Estimated GFR (MDRD)     


 


Glucose     ()  mg/dL


 


POC Glucose  448 H*  379 H   ()  mg/dL


 


Calcium     (8.5-10.1)  mg/dL














  02/10/20 02/10/20 Range/Units





  06:20 06:25 


 


WBC    (4.0-10.0)  x10^3/uL


 


RBC    (4.5-6.0)  x10^6/uL


 


Hgb    (14.0-18.0)  g/dL


 


Hct    (40.0-52.0)  %


 


MCV    (78.0-93.0)  fL


 


MCH    (26.0-32.0)  pg


 


MCHC    (32.0-36.0)  g/dL


 


RDW Coeff of Brandon    (10.0-15.0)  %


 


Plt Count    (130-400)  x10^3/uL


 


Neut % (Auto)    (50.0-80.0)  %


 


Lymph % (Auto)    (25.0-50.0)  %


 


Mono % (Auto)    (2.0-11.0)  %


 


Eos % (Auto)    (0.0-4.0)  %


 


Baso % (Auto)    (0.2-1.2)  %


 


Sodium  139   (136-145)  mmol/L


 


Potassium  3.8   (3.5-5.1)  mmol/L


 


Chloride  93 L   ()  mmol/L


 


Carbon Dioxide  42 H   (21-32)  mmol/L


 


Anion Gap  7.8 L   (10-20)  mmol/L


 


BUN  25 H   (7-18)  mg/dL


 


Creatinine  1.0   (0.70-1.30)  mg/dL


 


Est Cr Clr Drug Dosing  49.62   mL/min


 


Estimated GFR (MDRD)  > 60   


 


Glucose  199 H   ()  mg/dL


 


POC Glucose   202 H  ()  mg/dL


 


Calcium  9.2   (8.5-10.1)  mg/dL











RUMA Results - Last 24 hrs: 


 Microbiology











 02/06/20 13:06 Aerobic Blood Culture - Preliminary





 Blood - Venous - Lab Draw    NO GROWTH AFTER 4 DAYS





 Anaerobic Blood Culture - Final


 


 02/06/20 13:00 Aerobic Blood Culture - Preliminary





 Blood - Venous    NO GROWTH AFTER 4 DAYS





 Anaerobic Blood Culture - Final











Med Orders - Current: 


 Current Medications








Discontinued Medications





Albuterol/Ipratropium (Duoneb 3.0-0.5 Mg/3 Ml)  3 ml NEB Q4H PRN


   PRN Reason: dyspnea/wheezing


Albuterol/Ipratropium (Duoneb 3.0-0.5 Mg/3 Ml)  3 ml NEB Q4HRRT [facility]


   Last Admin: 02/10/20 06:04 Dose:  3 ml


Apixaban (Eliquis)  5 mg PO BID [facility]


   Last Admin: 02/10/20 07:53 Dose:  5 mg


Arformoterol Tartrate (Brovana)  15 mcg NEB BIDRT [facility]


   Last Admin: 02/10/20 06:04 Dose:  15 mcg


Budesonide (Pulmicort)  0.5 mg NEB BID [facility]


   Last Admin: 02/10/20 07:53 Dose:  0.5 mg


Doxycycline Hyclate (Vibramycin)  100 mg PO BID [facility]


   Last Admin: 02/10/20 07:53 Dose:  100 mg


Folic Acid (Folic Acid)  1 mg PO DAILY [facility]


   Last Admin: 02/10/20 07:53 Dose:  1 mg


Furosemide (Lasix)  40 mg IV ONETIME ONE


   Stop: 02/06/20 14:46


   Last Admin: 02/06/20 15:09 Dose:  40 mg


Furosemide (Lasix)  40 mg IV BIDDIURETIC [facility]


   Last Admin: 02/08/20 08:15 Dose:  40 mg


Furosemide (Lasix)  40 mg PO BIDDIURETIC [facility]


   Last Admin: 02/10/20 07:53 Dose:  40 mg


Magnesium Sulfate 2 gm/ Premix  50 mls @ 25 mls/hr IV ONETIME ONE


   Stop: 02/06/20 16:33


   Last Admin: 02/06/20 15:09 Dose:  25 mls/hr


Insulin Human Lispro (Humalog)  10 unit SUBCUT ONETIME ONE


   Stop: 02/07/20 12:25


   Last Admin: 02/07/20 12:49 Dose:  10 unit


Insulin Lispro Protam/Lispro Human (Humalog Mix 75-25)  24 unit SUBCUT BIDMEALS 

[facility]


   Last Admin: 02/10/20 07:53 Dose:  24 unit


Insulin Lispro Protam/Lispro Human (Humalog Mix 75-25)  10 unit SUBCUT DAILY ONE


   Stop: 02/06/20 18:44


   Last Admin: 02/06/20 18:52 Dose:  10 unit


Loratadine (Claritin)  10 mg PO DAILY [facility]


   Last Admin: 02/10/20 07:52 Dose:  10 mg


Lorazepam (Ativan)  0.5 mg IVPUSH Q4H PRN


   PRN Reason: Anxiety


   Last Admin: 02/07/20 02:35 Dose:  0.5 mg


Methylprednisolone Sodium Succinate (Solu-Medrol)  125 mg IM ONETIME ONE


   Stop: 02/06/20 12:53


   Last Admin: 02/06/20 13:03 Dose:  125 mg


Methylprednisolone Sodium Succinate (Solu-Medrol)  40 mg IVPUSH Q12H [facility]


   Last Admin: 02/07/20 06:00 Dose:  40 mg


Methylprednisolone Sodium Succinate (Solu-Medrol)  40 mg IVPUSH DAILY [facility]


   Last Admin: 02/08/20 08:15 Dose:  40 mg


Metoprolol Succinate (Toprol Xl)  150 mg PO DAILY [facility]


   Last Admin: 02/10/20 07:52 Dose:  150 mg


Ondansetron HCl (Zofran)  4 mg IV Q4H PRN


   PRN Reason: Nausea/Vomiting


Prednisone (Prednisone)  40 mg PO WITHBREAKFAST [facility]


   Last Admin: 02/10/20 07:53 Dose:  40 mg


Sulfasalazine (Sulfasalazine Dr)  1,000 mg PO BID [facility]


   Last Admin: 02/10/20 07:52 Dose:  1,000 mg

## 2020-02-14 NOTE — DISCH
PRIMARY DISCHARGE DIAGNOSES:

1. 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

    chronic obstructive pulmonary disease exacerbation.

2. Chronic diastolic combo systolic heart failure.

3. Severe chronic obstructive pulmonary disease.

4. Type 2 diabetes with hyperglycemia from steroids.

5. Rheumatoid arthritis.

6. Chronic anemia.

7. Atrial fibrillation.

8. Pulmonary nodules.

 

REASON FOR ADMISSION:  On the date of admission, this 84-year-old male had come

into the emergency room unresponsive.  He had a CO2 of 99.  He was placed on

BiPAP and his overall condition improved.  He did not have any x-ray evidence of

pneumonia.  He was wheezing.  He had some edema, but even his wife did not think

he had considerable fluid gains, but he had been worsening with his breathing

over several days prior to admission.  He was treated on acute cares with

nebulizers, IV steroids, IV Lasix, and was transitioned over to swing bed for

further therapies.  He has been up and moving with therapies and doing quite

well.  He tells me he still feels tired, but he thinks he will be able to walk

more when he gets home because here he is always getting assistance.  Initially,

he did require more oxygen.  At home, they had actually turned it up to 4 L, but

at this point he has been able to be weaned down to 2 L and is doing quite well.

He is denying any pain or cough or breathing difficulties and is hopeful to be

discharged home soon.  He has been getting increased insulin doses due to 40 mg

daily of prednisone when normally he takes 2.5 for his arthritis at home.

 

PHYSICAL EXAMINATION:

Discharge vitals:  Include a temperature 97, pulse 86, blood pressure 115/65,

respiratory rate 17, and O2 of 99% on 2 L.

General:  He is in no acute distress.

Heart:  Irregularly irregular, but without murmur.

Lungs:  Sounds are decreased with some slight rhonchi in the bases, but no

wheezing.

Abdomen:  Nondistended, nontender.

Extremities:  Warm and dry with just trace edema.

Mental status:  He is alert.  He is orientated x3.

 

LABORATORY DATA:  No lab work has been done other than blood sugars since coming

over to swing bed.  His last hemoglobin was 10.9, which is in his baseline

range.  His last creatinine was normal at 1 on the 10th.  Blood sugars were 134

this morning, 248 around lunchtime.  The highest in the last 48 hours was 314 in

the late afternoon.

 

DISCHARGE PLANS AND INSTRUCTIONS:  The patient is going home with his wife in

home health.  He will be on Lasix 80 mg once daily.  Because he was forgetting

to take the 2nd 40 mg dose at home, he will be on prednisone 20 mg daily for

another 5 days, then 2.5 daily after that.  He will be on the insulin 24 units

twice daily.  His blood sugars remain quite high.  We could always increase

this.  He recently went off Victoza due to some itching.  It has actually

improved.  He may do the metolazone 1-2 times a week if needed for fluid

retention.  His antibiotics of doxycycline were completed while he was here.  He

was not interested in pursuing any BiPAP or Trelegy type breathing machine.  At

this point, we did not change his inhalers or nebulizers.  





Face-to-face

encounter occurred with myself on 02/14/2020.  The primary purpose is home

health will be for nursing for teaching and assessments of his respiratory

medications as well to monitor for respiratory failure and assess vitals and

weights for teaching to avoid heart failure exacerbations.  He will also have

physical therapy to help with his mobility as he does use a walker and is

limited by dyspnea, but needs to increase endurance around his home.  Due to his

severe COPD and recent exacerbation, his dyspnea impairs his ambulation and

absences from home are infrequent and require him some taxing effort.  He does

require assist of another person as well as his oxygen to leave his home.  I

will periodically review this plan of care.  



Greater than 30 minutes spent on

this discharge process.  Does have a followup in the clinic with me on the 21st.

No lab work is due at that appointment for his hospital stay.  However, he is

due for an A1c, ferritin, CBC, and BMP for his regular followup.

 

 

MKA:  02/14/2020 12:03:28  MODL:  02/14/2020 21:37:40

Job #:  [id number]

RACHAEL

## 2020-02-20 NOTE — CR
______________________________________________________________________________   

  

[id number] RAD/RAD Chest PA or AP 1V  

EXAM: FRONTAL CHEST  

   

 INDICATION: SHORT OF BREATH.  

   

 COMPARISON: February 6, 2020.  

   

 DISCUSSION: Body habitus and portable technique somewhat limit this examination.  

 Low lung volumes. Cardiomegaly with development of mild to moderate central  

 vascular congestion. Bibasilar atelectasis. No infiltrates are detected, but the  

 low lung volumes could obscure pathology.  

   

 IMPRESSION:    

 1.  Cardiomegaly with mild central vascular congestion which is new relative to  

 the prior study.  

   

 Electronically signed by Rey Barr MD on 2/20/2020 1:04 PM  

   

  

Rey Barr MD                 

 02/20/20 4251    

  

Thank you for allowing us to participate in the care of your patient.

## 2020-02-20 NOTE — EDM.PDOC
ED HPI GENERAL MEDICAL PROBLEM





- General


Chief Complaint: Respiratory Problem


Stated Complaint: ER


Time Seen by Provider: 02/20/20 12:30


Source of Information: Reports: Patient, EMS, Family


History Limitations: Reports: No Limitations





- History of Present Illness


INITIAL COMMENTS - FREE TEXT/NARRATIVE: 





Patient transported to the ER via EMS after home health called states patient 

was having some shortness of breath and questionable decreased consciousness.  

Said he had a decreased O2 sat at home.  Per EMS arrival though he was on 2 L 

of oxygen had a 96% sat and had no respiratory issues upon arrival








Patient states he feels fine has no complaints he has no signs or symptoms of 

respiratory distress





Patient is alert and oriented x2 does not know the date answers all questions 

appropriately follows all commands





States was seen last week secondary to a COPD exacerbation is currently on 

prednisone antibiotics





He uses home O2 24/7 at 2 L/min nasal cannula states he has never been 

intubated but has been hospitalized multiple times for COPD


Onset: Today, Sudden


Duration: Hour(s):


Associated Symptoms: Reports: No Other Symptoms.  Denies: Confusion, Chest Pain

, Cough





- Related Data


 Allergies











Allergy/AdvReac Type Severity Reaction Status Date / Time


 


No Known Allergies Allergy   Verified 02/20/20 13:31











Home Meds: 


 Home Meds





Ergocalciferol (Vitamin D2) [Vitamin D2] 2,000 unit PO DAILY 05/15/18 [History]


Metoprolol Succinate [Toprol XL 50mg] 150 mg PO DAILY #90 tab.er 05/24/18 [Rx]


Budesonide [Pulmicort] 0.5 mg NEB BID 08/08/18 [History]


Ferrous Sulfate 325 mg PO DAILY 08/09/18 [History]


Cetirizine [ZyrTEC] 10 mg PO DAILY 08/30/19 [History]


Folic Acid 1 mg PO DAILY 08/30/19 [History]


Insuln Asp Prot/Insulin Aspart [NovoLOG Mix 70-30] 24 units SQ BIDMEALS 08/30/ 19 [History]


sulfaSALAzine [Azulfidine] 1,000 mg PO BID 08/30/19 [History]


Albuterol/Ipratropium [DuoNeb 3.0-0.5 MG/3 ML] 3 ml NEB Q4H PRN  neb 02/10/20 [

Rx]


Apixaban [Eliquis] 5 mg PO BID  tablet 02/10/20 [Rx]


Arformoterol [Brovana] 15 mcg NEB BIDRT  neb 02/10/20 [Rx]


Furosemide [Lasix] 80 mg PO DAILY #60 tablet 02/14/20 [Rx]


predniSONE 20 mg PO WITHBREAKFAST #5 tablet 02/14/20 [Rx]











Past Medical History


HEENT History: Reports: None


Cardiovascular History: Reports: Afib, Heart Failure, High Cholesterol, 

Hypertension


Respiratory History: Reports: COPD


Other Respiratory History: Pt is on ome O2


Musculoskeletal History: Reports: None, Arthritis (rheumatoid), Osteoarthritis, 

Osteoporosis, RA


Endocrine/Metabolic History: Reports: Diabetes, Type II





- Infectious Disease History


Infectious Disease History: Reports: None





- Past Surgical History


Musculoskeletal Surgical History: Reports: None





Social & Family History





- Family History


Respiratory: Reports: None


GI: Reports: None


: Reports: None


Neurological: Reports: MS


Endocrine/Metabolic: Reports: Diabetes, type II





- Caffeine Use


Caffeine Use: Reports: Coffee





ED ROS GENERAL





- Review of Systems


Review Of Systems: See Below


Constitutional: Reports: No Symptoms.  Denies: Fever, Chills, Malaise, Weakness

, Diaphoresis


HEENT: Reports: No Symptoms


Respiratory: Denies: Shortness of Breath, Wheezing, Pleuritic Chest Pain, Cough

, Sputum


Cardiovascular: Reports: No Symptoms.  Denies: Chest Pain, Dyspnea on Exertion, 

Lightheadedness, Palpitations


Endocrine: Reports: No Symptoms


GI/Abdominal: Reports: No Symptoms


: Reports: No Symptoms


Musculoskeletal: Reports: No Symptoms


Skin: Reports: No Symptoms


Neurological: Reports: No Symptoms


Psychiatric: Reports: No Symptoms


Hematologic/Lymphatic: Reports: No Symptoms


Immunologic: Reports: No Symptoms





ED EXAM, GENERAL





- Physical Exam


Exam: See Below


Exam Limited By: Uncooperative


General Appearance: Alert, No Apparent Distress


Eye Exam: Bilateral Eye: PERRL


Ears: Normal External Exam, Hearing Grossly Normal


Nose: Normal Inspection, Normal Mucosa, No Blood


Throat/Mouth: Normal Inspection, Normal Lips, Normal Teeth, Normal Gums, Normal 

Oropharynx, Normal Voice, No Airway Compromise


Head: Atraumatic, Normocephalic


Neck: Normal Inspection, Supple, Non-Tender, Full Range of Motion


Respiratory/Chest: No Respiratory Distress, Lungs Clear, Normal Breath Sounds, 

No Accessory Muscle Use, Chest Non-Tender, Other (Patient has mild end 

expiratory wheezing in the upper lobes bilateral)


Cardiovascular: Normal Peripheral Pulses, Regular Rate, Rhythm, No Edema, No 

Gallop, No JVD, No Murmur, No Rub


GI/Abdominal: Normal Bowel Sounds, Soft, Non-Tender, No Organomegaly, No 

Distention.  No: Guarding, Rigid, Rebound, Tender


Extremities: Normal Inspection, Normal Range of Motion, Non-Tender, Pedal Edema

, Other (+1 bilateral pedal edema)


Neurological: Alert, Oriented, CN II-XII Intact, Normal Cognition, No Motor/

Sensory Deficits


Psychiatric: Normal Affect, Normal Mood


Skin Exam: Warm, Dry, Intact, Normal Color, No Rash





Course





- Vital Signs


Text/Narrative:: 





CBC BMP chest x-ray EKG








Spoke with primary care provider Chyaito Peterson states patient has a follow-up 

appointment with her in the morning his BNP is less today than it has been when 

he was discharged in the hospital on 6 February PCP said give him some Lasix 

discharge home having him follow-up in the morning


Last Recorded V/S: 


 Last Vital Signs











Temp  36.9 C   02/20/20 14:28


 


Pulse  77   02/20/20 14:28


 


Resp  20   02/20/20 14:28


 


BP  102/47 L  02/20/20 14:28


 


Pulse Ox  98   02/20/20 14:28














- Orders/Labs/Meds


Labs: 


 Laboratory Tests











  02/20/20 02/20/20 02/20/20 Range/Units





  12:45 12:45 12:45 


 


WBC  8.1    (4.0-10.0)  x10^3/uL


 


RBC  3.39 L    (4.5-6.0)  x10^6/uL


 


Hgb  10.2 L    (14.0-18.0)  g/dL


 


Hct  34.3 L    (40.0-52.0)  %


 


MCV  101.2 H D    (78.0-93.0)  fL


 


MCH  30.1    (26.0-32.0)  pg


 


MCHC  29.7 L    (32.0-36.0)  g/dL


 


RDW Coeff of Brandon  14.1    (10.0-15.0)  %


 


Plt Count  189    (130-400)  x10^3/uL


 


Neut % (Auto)  86.0 H    (50.0-80.0)  %


 


Lymph % (Auto)  5.2 L    (25.0-50.0)  %


 


Mono % (Auto)  7.6    (2.0-11.0)  %


 


Eos % (Auto)  1.0    (0.0-4.0)  %


 


Baso % (Auto)  0.2    (0.2-1.2)  %


 


Sodium   140   (136-145)  mmol/L


 


Potassium   4.2   (3.5-5.1)  mmol/L


 


Chloride   96 L   ()  mmol/L


 


Carbon Dioxide   40 H   (21-32)  mmol/L


 


Anion Gap   8.2 L   (10-20)  mmol/L


 


BUN   15   (7-18)  mg/dL


 


Creatinine   0.9   (0.70-1.30)  mg/dL


 


Est Cr Clr Drug Dosing   TNP   


 


Estimated GFR (MDRD)   > 60   


 


Glucose   243 H   ()  mg/dL


 


Calcium   8.7   (8.5-10.1)  mg/dL


 


NT-Pro-B Natriuret Pep    2824 H  (<=450)  pg/mL











Meds: 


Medications














Discontinued Medications














Generic Name Dose Route Start Last Admin





  Trade Name Freq  PRN Reason Stop Dose Admin


 


Furosemide  20 mg  02/20/20 14:24  02/20/20 14:35





  Lasix  IM  02/20/20 14:25  20 mg





  ONETIME ONE   Administration





     





     





     





     














Departure





- Departure


Time of Disposition: 14:30


Disposition: Home, Self-Care 01


Condition: Fair


Clinical Impression: 


 SOB (shortness of breath)





CHF (congestive heart failure)


Qualifiers:


 Qualified Code(s): I50.23 - Acute on chronic systolic (congestive) heart 

failure








- Discharge Information


*PRESCRIPTION DRUG MONITORING PROGRAM REVIEWED*: No


*COPY OF PRESCRIPTION DRUG MONITORING REPORT IN PATIENT DINA: No


Instructions:  Heart Failure, Easy-to-Read


Referrals: 


Chayito Peterson,  [Primary Care Provider] - 


Forms:  ED Department Discharge


Additional Instructions: 


Make sure you follow-up with your primary care provider Dr. Chayito Peterson in 

the morning for your regular scheduled appointment





Continue to take all medications as directed





Use your nasal cannula oxygen at 2 to 3 L/min only








Return to the emergency room if anything gets worse or changes





Sepsis Event Note





- Focused Exam


Vital Signs: 


 Vital Signs











  Temp Pulse Resp BP Pulse Ox Pulse Ox


 


 02/20/20 14:28  36.9 C  77  20  102/47 L  98 


 


 02/20/20 13:32    18  119/67  95 


 


 02/20/20 12:25  36.8 C  78  28 H  105/56 L  97 


 


 02/20/20 12:22       97











Date Exam was Performed: 02/20/20


Time Exam was Performed: 19:14





- Problem List & Annotations


(1) CHF, Congestive heart failure


SNOMED Code(s): 36811720


   Code(s): I50.9 - HEART FAILURE, UNSPECIFIED   Status: Acute   Priority: High

   





(2) SOB (shortness of breath)


SNOMED Code(s): 506463877


   Code(s): R06.02 - SHORTNESS OF BREATH   Status: Acute

## 2020-05-29 NOTE — CR
______________________________________________________________________________   

  

[id number] RAD/RAD Chest PA or AP 1V  

EXAM:  RAD Chest PA or AP 1V  

   

 INDICATION:  SHORTNESS OF BREATH.  

   

 COMPARISON:  February 20, 2020.  

   

 DISCUSSION:    

   

 Cardiomediastinal silhouette is stable in size and contour.  

   

 Stable bilateral pulmonary opacifications. Findings are superimposed on a  

 background of chronic obstructive pulmonary disease. No pneumothorax or pleural  

 effusion.  

   

 IMPRESSION:  

 Stable chest without acute cardiopulmonary findings.  

   

 Electronically signed by Mark Ramsey MD on 5/29/2020 12:27 PM  

   

  

Mark Ramsey DO                 

 05/29/20 0934    

  

Thank you for allowing us to participate in the care of your patient.

## 2020-05-29 NOTE — HP
CHIEF COMPLAINT:  More tired and fluid retention.

 

HISTORY OF PRESENT ILLNESS:  This is an 85-year-old male with known history of

recurrent admissions for CO2 retention, COPD, and heart failure, diastolic,

whose wife noted to be more sluggish yesterday.  He was having more labored

breathing and sort of just could not cough things up, although he was not 
having a

worsening cough.  He was having more leg swelling and he gained weight.  She had

home health come over today and they agreed that he should be evaluated in the

ER, so EMS was called.  The patient is supposed to be using the Trelegy, but he

only uses maybe every few days for a few hours with a nap because he does not

like it and his wife states that she is just not going to push the issue.  He

has not had any fever.  He was in increased respirations in the ER, but his

rates improved after he got Lasix and a neb.  CO2 was 125.  His oxygen levels

were okay on his oxygen.  He normally takes 2.5 L at home.  He was on 3 L in the

ER.  His last improved ABG CO2 was down to 67.  His last admission was in 
February.

He has been having these admissions every 3 months for a couple of years.

Palliative Care has been strongly encouraged.  The patient himself states he

does not know why he is in the hospital.  He would prefer to be at home, but his

wife is unable to care for him.  His pCO2 was 99 back in February.

 

ALLERGIES:  He has no allergies.

 

MEDICATIONS:  His medication list is reviewed.  He is on sulfasalazine 1000 mg

twice daily.  He is on Pulmicort nebs twice daily, Demadex 20 mg twice daily,

DuoNebs 70/30, NovoLog insulin 24 units b.i.d., Brovana 15 mcg b.i.d. nebs,

Toprol 150 daily, prednisone 2.5 daily, Eliquis 5 mg twice daily, iron 325

daily, folic acid daily, vitamin D daily.

 

PAST MEDICAL HISTORY:

1. Chronic anemia due to chronic disease and iron deficiency, 

chronic atrial fibrillation, 

chronic diastolic heart failure.  His last EF was 60% here in

    2017, but 40% in 2018 in [place].  

Osteoporosis with previous lumbar

    compression fractures, 

cognitive impairment, worse back in 2019 after being

    in the hospital in [place], scored only 9/30 on the SLUMS.

. Type 2 diabetes, on long-term insulin, controlled with neuropathy.

. COPD, severe with frequent exacerbations and chronic hypoxic respiratory

    failure.

Hyperlipidemia, hypogonadism, osteoporosis, rheumatoid arthritis, and

    pulmonary nodules.

 

PAST SURGICAL HISTORY:  The patient has had a colonoscopy, otherwise none.

 

FAMILY HISTORY:  His parents are .  His brother had MS.  He is .

Another brother .

 

SOCIAL HISTORY:  He lives at home with his wife.  He is a retired farmer.  He

used to smoke, but he has quit.  He no longer uses any alcohol.

 

REVIEW OF SYSTEMS:

I am unable to reliably obtain from the patient, although his wife noted the

weight gain.  The patient was pretty somnolent.  He did seem to recognize me.

He was actually talking about dill pickles, which he is aware that I have made.

He was saying "aw" when the nurses were moving him over to the bed, but denies

any pain currently.  He denies any falls.

 

PHYSICAL EXAMINATION:

Vital Signs:  Objectively, on his admission, his weight is 111.1 kg stated.  His

last weight from the clinic was in February and he was 108 kg.  His temperature

is 97.1, pulse 76, blood pressure 121/64, respiratory rate 20, and O2 was 91% on

3.5 L.

General:  He is in no acute distress.

Heart:  Irregularly irregular.

Lungs:  His lung sounds are decreased and rhonchi noted throughout.  In fact,

you can hear upper airway noise without even using a stethoscope.  I could not

appreciate any wheezing.

Abdomen:  Nondistended, nontender.

Extremities:  Warm and dry.  He has 2+ edema in his shin.

Mental Status:  He is alert.  He is easily awoken.  He seems to recognize me and

that he is at the hospital.  He cannot give me the day.

Skin:  Otherwise, his skin did not show any abnormal lesions or rashes.  No

mottling.

 

DIAGNOSTIC DATA:  Chest x-ray reviewed, did not show any infiltrates, but it did

show pulmonary edema.  EKG showed AFib.  No acute changes.

 

LABORATORY DATA:  His white count normal 9.3, hemoglobin 11, platelets 183.  INR

was 0.9.  PH was 7.2, pCO2 of 125, pO2 of 99.  This was on 3.5 L.  Sodium 136,

potassium 4.4, chloride 91, bicarb 50, BUN 23, creatinine 0.9, glucose 203.

Lactic normal 0.9.  ALT, AST, bilirubin all normal.  Troponin less than 0.017.

ProBNP 3471.  COVID PCR negative.  Albumin 3.6.

 

ASSESSMENT:

1. Acute on chronic hypoxic and hypercapnic respiratory failure, likely

    related to congestive heart failure and potentially chronic obstructive

    pulmonary disease exacerbation.  The patient is not able to tell me about

    increased sputum.  Wife felt that he could not cough the things up.  At

    this point, I was going to place him on oral doxycycline, but he refused,

    and was unable to take oral medications, so I gave him IV Rocephin.  I do

    not feel that he is septic.  I feel this is more of a respiratory issue.

    He also does not have pneumonia on his X-ray.

2. Acute on chronic systolic and diastolic heart failure exacerbation.  Known

    EF was 40% back in 2018.  I will place him on scheduled 60 mg twice daily

    IV Lasix.  Monitor his in's and out's.  He refuses a catheter.  We will do

    bladder scans.  As long as he is emptying okay, he will not need it.  We

    will repeat lab work tomorrow and replace electrolytes as needed.

3. Type 2 diabetes.  We will do q.i.d. Accu-Cheks.  I will put him on just

    Lantus insulin 5 units for now as I do not know that he will be eating.  He

    does not require any IV steroids.

4. Rheumatoid arthritis.  We will continue his home medications.  Hopefully,

    he is able to take them orally.  If not, I might use Solu-Medrol low dose

    for his underlying arthritis.

5. Chronic anemia.  His hemoglobin has actually improved up to 11 today.  We

    will continue to monitor.

6. Atrial fibrillation, rate controlled.  We will try to get him taking his

    Eliquis and beta-blocker.  We will place him on telemetry.  If he continues

    to refuse medications, I will likely need to put him on IV metoprolol.

7. History of pulmonary nodules.  They have been stable.  He is

    following with Pulmonary and actually has a followup with them Monday,

    which I have already notified them that needs to be rescheduled.

 

PLAN:  The patient is admitted for acute cares.  He is placed on BiPAP.

Discussed with RT .  I will repeat a venous blood gas to make sure his CO2

was going down in about 3 hours after that is set up and using it.  Otherwise, 
repeat

electrolytes in the morning.  Q.i.d. Accu-Cheks.  Adjust insulin if needed.

Diabetic diet.  Discussed with the patient and palliative cares are in place.  
He

actually would prefer to go home and not even come into the hospital.  I told

him that his wife was unable to care for him at home and next time that likely

he very much could stay home on hospice.

However, they had been resistant to hospice at this point as I have encouraged 
it

previously.

 

 

MKA:  2020 16:46:04  MODL:  2020 23:26:59

Job #:  [id number]

MTDD

## 2020-05-29 NOTE — EDM.PDOC
ED HPI GENERAL MEDICAL PROBLEM





- General


Chief Complaint: Respiratory Problem


Stated Complaint: ER


Time Seen by Provider: 05/29/20 10:25


Source of Information: Reports: Patient, EMS, RN





- History of Present Illness


INITIAL COMMENTS - FREE TEXT/NARRATIVE: 





Teofilo is an 86 y/o male who was brought to the ER by EMS after the home 

health nurse noted that he was more SOB at his visit. The patient  is unable to 

tell CNP why he is here and states "I don't know." He denies any fever. He has 

had more swelling in his lower legs also.





Vital were stable in the ambulance.





- Related Data


 Allergies











Allergy/AdvReac Type Severity Reaction Status Date / Time


 


No Known Allergies Allergy   Verified 05/29/20 11:04











Home Meds: 


 Home Meds





Ergocalciferol (Vitamin D2) [Vitamin D2] 2,000 unit PO DAILY 05/15/18 [History]


Metoprolol Succinate [Toprol XL 50mg] 150 mg PO DAILY #90 tab.er 05/24/18 [Rx]


Budesonide [Pulmicort] 0.5 mg NEB BID 08/08/18 [History]


Ferrous Sulfate 325 mg PO DAILY 08/09/18 [History]


Folic Acid 1 mg PO DAILY 08/30/19 [History]


Insuln Asp Prot/Insulin Aspart [NovoLOG Mix 70-30] 24 units SQ BIDMEALS 08/30/ 19 [History]


sulfaSALAzine [Azulfidine] 1,000 mg PO BID 08/30/19 [History]


Albuterol/Ipratropium [DuoNeb 3.0-0.5 MG/3 ML] 3 ml NEB Q4H PRN  neb 02/10/20 [

Rx]


Apixaban [Eliquis] 5 mg PO BID  tablet 02/10/20 [Rx]


Arformoterol [Brovana] 15 mcg NEB BIDRT  neb 02/10/20 [Rx]


Ascorbic Acid [Vitamin C] 1,000 mg PO DAILY 05/29/20 [History]


Torsemide 20 mg PO BID 05/29/20 [History]


predniSONE 5 mg PO WITHBREAKFAST 05/29/20 [History]











Past Medical History


HEENT History: Reports: None


Cardiovascular History: Reports: Afib, Heart Failure, High Cholesterol, 

Hypertension


Respiratory History: Reports: COPD


Other Respiratory History: Pt is on ome O2


Musculoskeletal History: Reports: None, Arthritis (rheumatoid), Osteoarthritis, 

Osteoporosis, RA


Endocrine/Metabolic History: Reports: Diabetes, Type II





- Infectious Disease History


Infectious Disease History: Reports: None





- Past Surgical History


Musculoskeletal Surgical History: Reports: None





Social & Family History





- Family History


Respiratory: Reports: None


GI: Reports: None


: Reports: None


Neurological: Reports: MS


Endocrine/Metabolic: Reports: Diabetes, type II





- Caffeine Use


Caffeine Use: Reports: Coffee





ED ROS GENERAL





- Review of Systems


Review Of Systems: See Below


Constitutional: Reports: Weakness, Fatigue.  Denies: Fever


HEENT: Reports: No Symptoms


Respiratory: Reports: Shortness of Breath, Cough


Cardiovascular: Reports: Edema.  Denies: Chest Pain


Endocrine: Reports: No Symptoms


GI/Abdominal: Reports: No Symptoms


: Reports: No Symptoms


Musculoskeletal: Reports: No Symptoms


Skin: Reports: No Symptoms


Neurological: Reports: No Symptoms


Psychiatric: Reports: No Symptoms


Hematologic/Lymphatic: Reports: No Symptoms


Immunologic: Reports: No Symptoms





ED EXAM, GENERAL





- Physical Exam


Exam: See Below


General Appearance: Alert, Obese (Elderly male, NAD.)


Eye Exam: Bilateral Eye: PERRL


Ears: Normal External Exam, Hearing Grossly Normal, Other (Note soft yellow 

cerumen blocking both TMs)


Nose: Normal Inspection


Throat/Mouth: Normal Inspection, Normal Gums, Normal Oropharynx, Normal Voice


Head: Atraumatic, Normocephalic


Neck: Supple, Non-Tender


Respiratory/Chest: Decreased Breath Sounds, Rales, Wheezing, Prolonged 

Expiration


Cardiovascular: Normal Peripheral Pulses, Regular Rate, Rhythm, No Murmur


GI/Abdominal: Normal Bowel Sounds, Soft, Non-Tender, Other (Obese)


 (Male) Exam: Deferred


Rectal (Males) Exam: Deferred


Back Exam: Normal Inspection


Extremities: Non-Tender (2-3+ pitting edema to lower legs to mid-calf region), 

Pedal Edema


Neurological: Alert, Oriented, CN II-XII Intact, Normal Cognition


Psychiatric: Normal Affect


Skin Exam: Warm, Dry, Intact, Normal Color


Lymphatic: No Adenopathy





EKG INTERPRETATION


EKG Date: 05/29/20


Time: 11:43


Rhythm: A-Fib


EKG Interpretation Comments: 





A Fib








Course





- Vital Signs


Text/Narrative:: 





The patient was seen by the CNP. Labs, EKG, and CXR ordered. Vitals were 

stable. 





The patient was given Lasix 40mg IVP and A Duoneb.





Case discussed with Dr Chayito Peterson, Jeanie ordered.





1300 Notified Dr Peterson and will plan to admit patient to Acute Care. Suspect 

COPD and CHF exacerbation combined. See ordered and H&P per Dr Peterson.





Last Recorded V/S: 


 Last Vital Signs











Temp  36.6 C   05/29/20 10:25


 


Pulse  63   05/29/20 12:35


 


Resp  19   05/29/20 12:35


 


BP  139/63   05/29/20 12:35


 


Pulse Ox  100   05/29/20 12:35














- Orders/Labs/Meds


Orders: 


 Active Orders 24 hr











 Category Date Time Status


 


 Admission Status [Patient Status] [ADT] Routine ADT  05/29/20 13:03 Ordered


 


 Cardiac Monitoring [RC] .AS DIRECTED Care  05/29/20 13:03 Ordered


 


 EKG Documentation Completion [RC] STAT Care  05/29/20 10:44 Active


 


 RT Aerosol Therapy [RC] ASDIRECTED Care  05/29/20 11:38 Active


 


 CULTURE BLOOD [BC] Stat Lab  05/29/20 11:25 Received


 


 Sodium Chloride 0.9% [Saline Flush] Med  05/29/20 10:44 Active





 10 ml FLUSH ASDIRECTED PRN   


 


 Blood Culture x2 Reflex Set [OM.PC] Stat Oth  05/29/20 10:44 Ordered


 


 Saline Lock Insert [OM.PC] Stat Oth  05/29/20 10:44 Ordered








 Medication Orders





Sodium Chloride (Saline Flush)  10 ml FLUSH ASDIRECTED PRN


   PRN Reason: Keep Vein Open








Labs: 


 Laboratory Tests











  05/29/20 05/29/20 05/29/20 Range/Units





  10:50 11:25 11:25 


 


WBC   9.3   (4.0-10.0)  x10^3/uL


 


RBC   3.67 L   (4.5-6.0)  x10^6/uL


 


Hgb   11.0 L   (14.0-18.0)  g/dL


 


Hct   36.8 L   (40.0-52.0)  %


 


MCV   100.3 H   (78.0-93.0)  fL


 


MCH   30.0   (26.0-32.0)  pg


 


MCHC   29.9 L   (32.0-36.0)  g/dL


 


RDW Coeff of Brandon   13.3   (10.0-15.0)  %


 


Plt Count   183   (130-400)  x10^3/uL


 


Neut % (Auto)   84.9 H   (50.0-80.0)  %


 


Lymph % (Auto)   7.3 L   (25.0-50.0)  %


 


Mono % (Auto)   6.4   (2.0-11.0)  %


 


Eos % (Auto)   1.3   (0.0-4.0)  %


 


Baso % (Auto)   0.1 L   (0.2-1.2)  %


 


PT    10.1  (9.5-12.3)  SEC


 


INR    0.9 L  (2.0-3.5)  


 


APTT    23.5 L  (25.6-32.8)  SEC


 


POC ABG pH     (7.35-7.45)  


 


POC ABG pCO2     (35-45)  mmHG


 


POC ABG pO2     ()  mmHG


 


POC ABG HCO3     (22-26)  mmol/L


 


POC ABG O2 Sat     (95-98)  %


 


POC ABG Base Excess     (-2-3)  mmol/L


 


POC FiO2     


 


Sodium     (136-145)  mmol/L


 


Potassium     (3.5-5.1)  mmol/L


 


Chloride     ()  mmol/L


 


Carbon Dioxide     (21-32)  mmol/L


 


Anion Gap     (10-20)  mmol/L


 


BUN     (7-18)  mg/dL


 


Creatinine     (0.70-1.30)  mg/dL


 


Est Cr Clr Drug Dosing     


 


Estimated GFR (MDRD)     


 


Glucose     ()  mg/dL


 


Lactic Acid     (0.4-2.0)  mmol/L


 


Calcium     (8.5-10.1)  mg/dL


 


Corrected Calcium     (8.5-10.1)  mg/dL


 


Total Bilirubin     (0.2-1.0)  mg/dL


 


AST     (15-37)  U/L


 


ALT     (16-63)  U/L


 


Alkaline Phosphatase     ()  U/L


 


Troponin I     (<=0.056)  ng/mL


 


NT-Pro-B Natriuret Pep     (<=450)  pg/mL


 


Total Protein     (6.4-8.2)  g/dL


 


Albumin     (3.4-5.0)  g/dL


 


Globulin     


 


Albumin/Globulin Ratio     


 


POC Result Comm     


 


SARS-CoV-2 RNA (RT-PCR)  Negative    (NEGATIVE)  














  05/29/20 05/29/20 05/29/20 Range/Units





  11:25 11:25 12:49 


 


WBC     (4.0-10.0)  x10^3/uL


 


RBC     (4.5-6.0)  x10^6/uL


 


Hgb     (14.0-18.0)  g/dL


 


Hct     (40.0-52.0)  %


 


MCV     (78.0-93.0)  fL


 


MCH     (26.0-32.0)  pg


 


MCHC     (32.0-36.0)  g/dL


 


RDW Coeff of Brandon     (10.0-15.0)  %


 


Plt Count     (130-400)  x10^3/uL


 


Neut % (Auto)     (50.0-80.0)  %


 


Lymph % (Auto)     (25.0-50.0)  %


 


Mono % (Auto)     (2.0-11.0)  %


 


Eos % (Auto)     (0.0-4.0)  %


 


Baso % (Auto)     (0.2-1.2)  %


 


PT     (9.5-12.3)  SEC


 


INR     (2.0-3.5)  


 


APTT     (25.6-32.8)  SEC


 


POC ABG pH    7.230 L*  (7.35-7.45)  


 


POC ABG pCO2    125 H*  (35-45)  mmHG


 


POC ABG pO2    99  ()  mmHG


 


POC ABG HCO3    52 H  (22-26)  mmol/L


 


POC ABG O2 Sat    95  (95-98)  %


 


POC ABG Base Excess    25 H  (-2-3)  mmol/L


 


POC FiO2    0.28  


 


Sodium  136    (136-145)  mmol/L


 


Potassium  4.4    (3.5-5.1)  mmol/L


 


Chloride  91 L    ()  mmol/L


 


Carbon Dioxide  50 H    (21-32)  mmol/L


 


Anion Gap  -0.6 L    (10-20)  mmol/L


 


BUN  23 H    (7-18)  mg/dL


 


Creatinine  0.9    (0.70-1.30)  mg/dL


 


Est Cr Clr Drug Dosing  TNP    


 


Estimated GFR (MDRD)  > 60    


 


Glucose  203 H    ()  mg/dL


 


Lactic Acid   0.9   (0.4-2.0)  mmol/L


 


Calcium  8.9    (8.5-10.1)  mg/dL


 


Corrected Calcium  9.22    (8.5-10.1)  mg/dL


 


Total Bilirubin  0.3    (0.2-1.0)  mg/dL


 


AST  17    (15-37)  U/L


 


ALT  19    (16-63)  U/L


 


Alkaline Phosphatase  72    ()  U/L


 


Troponin I  < 0.017    (<=0.056)  ng/mL


 


NT-Pro-B Natriuret Pep  3471 H    (<=450)  pg/mL


 


Total Protein  7.4    (6.4-8.2)  g/dL


 


Albumin  3.6    (3.4-5.0)  g/dL


 


Globulin  3.8    


 


Albumin/Globulin Ratio  0.95    


 


POC Result Comm    Called critical res  


 


SARS-CoV-2 RNA (RT-PCR)     (NEGATIVE)  











Meds: 


Medications











Generic Name Dose Route Start Last Admin





  Trade Name Freq  PRN Reason Stop Dose Admin


 


Sodium Chloride  10 ml  05/29/20 10:44  





  Saline Flush  FLUSH   





  ASDIRECTED PRN   





  Keep Vein Open   





     





     





     














Discontinued Medications














Generic Name Dose Route Start Last Admin





  Trade Name Freq  PRN Reason Stop Dose Admin


 


Albuterol/Ipratropium  3 ml  05/29/20 11:38  05/29/20 11:52





  Duoneb 3.0-0.5 Mg/3 Ml  NEB  05/29/20 11:39  3 ml





  ONETIME ONE   Administration





     





     





     





     


 


Furosemide  40 mg  05/29/20 11:39  05/29/20 11:52





  Lasix  IV  05/29/20 11:40  40 mg





  ONETIME ONE   Administration





     





     





     





     














- Radiology Interpretation


Free Text/Narrative:: 





CXR=note stable bilateral pulmonary opacifications (See final report)








- Re-Assessments/Exams


Free Text/Narrative Re-Assessment/Exam: 





05/29/20 13:08


Resp rate=20 and Sats 97% on 2 liters, Patient drowsy and falling asleep.





Departure





- Departure


Time of Disposition: 13:10


Disposition: Admitted As Inpatient 66


Condition: Good, Fair


Clinical Impression: 


 COPD with acute exacerbation, CHF, Congestive heart failure, SOB (shortness of 

breath)





Atrial fibrillation


Qualifiers:


 Atrial fibrillation type: paroxysmal Qualified Code(s): I48.0 - Paroxysmal 

atrial fibrillation





Respiratory failure


Qualifiers:


 Chronicity: acute on chronic Respiratory failure complication: hypoxia and 

hypercapnia Qualified Code(s): J96.21 - Acute and chronic respiratory failure 

with hypoxia








- Discharge Information


Forms:  ED Department Discharge


Additional Instructions: 


-Admit to Acute Care to Dr Chayito Peterson





Sepsis Event Note





- Evaluation


Sepsis Screening Result: No Definite Risk





- Focused Exam


Vital Signs: 


 Vital Signs











  Temp Pulse Resp BP Pulse Ox


 


 05/29/20 12:35   63  19  139/63  100


 


 05/29/20 11:52   82   


 


 05/29/20 11:25   75  22 H  140/70  98


 


 05/29/20 10:25  36.6 C  69  22 H  126/61  98











Date Exam was Performed: 05/29/20


Time Exam was Performed: 13:06





- My Orders


Last 24 Hours: 


My Active Orders





05/29/20 10:44


EKG Documentation Completion [RC] STAT 


Sodium Chloride 0.9% [Saline Flush]   10 ml FLUSH ASDIRECTED PRN 


Blood Culture x2 Reflex Set [OM.PC] Stat 


Saline Lock Insert [OM.PC] Stat 





05/29/20 11:25


CULTURE BLOOD [BC] Stat 





05/29/20 11:38


RT Aerosol Therapy [RC] ASDIRECTED 





05/29/20 13:03


Admission Status [Patient Status] [ADT] Routine 


Cardiac Monitoring [RC] .AS DIRECTED 














- Assessment/Plan


Last 24 Hours: 


My Active Orders





05/29/20 10:44


EKG Documentation Completion [RC] STAT 


Sodium Chloride 0.9% [Saline Flush]   10 ml FLUSH ASDIRECTED PRN 


Blood Culture x2 Reflex Set [OM.PC] Stat 


Saline Lock Insert [OM.PC] Stat 





05/29/20 11:25


CULTURE BLOOD [BC] Stat 





05/29/20 11:38


RT Aerosol Therapy [RC] ASDIRECTED 





05/29/20 13:03


Admission Status [Patient Status] [ADT] Routine 


Cardiac Monitoring [RC] .AS DIRECTED

## 2020-05-30 NOTE — PN
Progress Note for GILBERT BARBER  Date:  05/30/2020  Room #:  VM.214

 

SUBJECTIVE:  Hospital day #2 on an 85-year-old admitted for acute on chronic

combo systolic and diastolic heart failure exacerbation with also acute on

chronic hypercapnic respiratory failure after being more sluggish at home.  The

patient was having more shortness of breath and edema but no fevers, no

worsening cough.  He is more alert this morning.  He did tolerate his BiPAP all

night.  He was able to eat 100% of his breakfast.  He had repeat ABGs that

showed his CO2 last evening down to 94.  He has been able to be weaned down to

30% on his FiO2.  He otherwise did not get insulin last evening and his blood

sugars have been excellent.  Unfortunately, his blood cultures were positive 2

of 2 for gram-positive cocci in clusters today.  He has not had any burning with

urination or other symptoms to suggest infection.  He denies any new back pain.

He says his feet are sore.  He does have diabetic neuropathy, but there are no

foot ulcers noted.  His legs are mildly red bilaterally likely due to his edema

from CHF.  Otherwise, he denies chest pain.  His heart rates on telemetry are in

atrial fibrillation in the 80s to 90s.  He did have a few times where he went up

into the 120s, but he was taking his oral metoprolol last evening.  He was

started on IV Rocephin yesterday due to not taking the oral doxycycline for the

COPD exacerbation.



Wife later told me he had some drainage from the right elbow so wonders if that 
might have

been the source of infection.

 

OBJECTIVE:  VITAL SIGNS:  His temperature is 97.5, his pulse 86, blood pressure

121/76, respiratory rate 13, and O2 of 96 on 35% FiO2, 94 currently on 30% FiO2.

GENERAL:  He is in no acute distress.

HEART:  Irregularly irregular.  Heart tones are distant.

LUNGS:  His lungs sounds, though are clear to auscultation bilaterally without

crackles or wheezes.

ABDOMEN:  Positive bowel sounds.  Soft, nontender.

EXTREMITIES:  Warm and dry.  He still has 2+ edema to the mid shin.  There is

some redness distally bilaterally but no warmth.  Right elbow no drainage, not 
tender, 

normal ROM, small lump noted but no bursitis

MENTAL STATUS:  He is alert.  He is orientated x3.  He is aware that he is at

the hospital, but he does not know why.  He wants to go home, but this is a

normal thing for him.  He is able to give me my name.  He knows the date.

GENITOURINARY:  It should be noted Esquivel is in place.  We had discussed placing

it yesterday if he was unable to void and he was having overflow incontinence.

His urine output currently is -890 since admission yesterday afternoon.

 

LABORATORY WORK:  This morning, white count normal at 5.5, hemoglobin stable at

11, and platelets 171.  Sodium 139, potassium 3.9, chloride 92, bicarbonate 55,

BUN 0.9, glucose 129, calcium 9, and CRP 8.9.

 

ASSESSMENT AND PLAN:

1. 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

    He is still tolerating the BiPAP.  We will still leave him on that today.

    He is still somnolent at times.

2. Acute on chronic systolic and diastolic heart failure exacerbation.  We

    will continue with intravenous Lasix 60 mg intravenous b.i.d. as long as

    his blood pressure tolerates it.

3. Atrial fibrillation, rate controlled but some episodes of RVR.  He will

    continue on metoprolol.

4. Gram-positive bacteremia.  I am repeating blood cultures now.  I will

    increase Rocephin to 1 g q.12.  We will check a urinalysis in case this is

    Enterococcus.  Chest x-ray did not show pneumonia.  He has no other source

    of infection currently.  I will also start him on intravenous vancomycin

    and await further results of the cultures.

5. Type 2 diabetes.  His blood sugars are well controlled now that he is

    eating better.  We will continue to monitor with q.i.d. checks.  Right now,

    no insulin is actually ordered for him, but we will resume it if needed.

6. Rheumatoid arthritis.  He is taking his low-dose 2.5 mg daily of

    prednisone.  I am going to stop the sulfasalazine given the concern for

    underlying infection.

7. Chronic anemia.  Hemoglobin stable.

8. History of pulmonary nodules.  Those have been stable.

9. Obesity.

10.Palliative care.

 

PLAN:  At this point, the patient will continue with acute cares.  We will

continue BiPAP, weaning from that so he can eat.  Likely repeat another venous

blood gas in the morning just to ensure he can have continued improvement.  I

have added IV vancomycin today.  I have tried to contact his wife, and we will

get a hold of her later today.  I feel the patient's condition is critical.

Potentially, he may not survive.  He certainly does want to go home, although

that is not practical at this point.  He does understand that he would pass on

without further treatments.  He has no interest in being transferred to [place].

Continue palliative cares here.

 

 

MKA:  05/30/2020 10:27:30  MODL:  05/30/2020 11:07:55

Job #:  [id number]

MTDD

## 2020-05-31 NOTE — PN
Progress Note for GILBERT BARBER  Date:  05/31/2020  Room #:  VM.214

 

SUBJECTIVE:  This is hospital day #3 for an 85-year-old admitted with an acute

on chronic combo systolic and diastolic heart failure exacerbation, also with an

acute on chronic hypercapnic respiratory failure.  After being more somnolent at

home, the patient has improved here.  He is eating 100% of his meals.

Therefore, his blood sugars have been higher, like in the 200s and 300s.  His

pCO2, which was 125, came down to 67 this morning.  He has been on BiPAP most of

the time, taking it off for meals.  He had a couple of doses of Ativan overnight

because he did not like wearing it, but when he was off it, he was quite

tachycardic with his atrial fibrillation up to the 120s and 150s.  He is denying

any chest pain.  He feels his breathing has improved.  He is not having any

cough.  His leg swelling has improved.  He has had good urine output, -1.6 L

since admission.  He has been afebrile despite his positive blood cultures 2 of

2 for staph species.  His right elbow does hurt a little.  There is no drainage.

There is good range of motion.  There does not appear to be any abscess there.

The patient does have underlying rheumatoid arthritis.  He was on sulfasalazine.

Now, he is on also low-dose prednisone 2.5 chronically.  Blood pressures have

been excellent.  He is not feeling lightheaded or dizzy.  He has not had a bowel

movement in 3 days.  He does not seem bothered by it, but nursing were

concerned.

 

PHYSICAL EXAMINATION:

Vital Signs:  His temperature is 97.4; pulse 99, but recently, before BiPAP

placed again, 132; and respiratory rate 15.  O2 of 97 on 35% FiO2 on the BiPAP,

it was down to 30% yesterday when he was wearing it more consistently.

General:  He is in no acute distress.

Heart:  Regularly irregular.

Lungs:  Sounds are clear to auscultation bilaterally without crackles or

wheezes.

Abdomen:  Positive bowel sounds.  It is soft and nontender.

Genitourinary:  Esquivel is in place.

Extremities:  Warm and dry.  He still has just trace edema in his ankles.

Mental Status:  He is alert.  He is orientated x3.

 

LABORATORY DATA:  Lab work does show him to have a white count of 6.2,

hemoglobin 11.2, and platelets 155.  The pH is 7.4 and pCO2 of 67.  Sodium 138;

potassium 4; chloride 92; bicarbonate 43; BUN 23; creatinine 1.1; and glucose

296, at 6 a.m. his fasting was 145.  Urine only showed rbc's, no wbc's.  His

sedimentation rate yesterday did come back at 49.  His CRP was 8.9.  Blood

cultures were repeated yesterday.  So far, no growth.

 

ASSESSMENT:

1. Somnolence.  Likely due to bacteremia, hypercapnia, and heart failure.

    This is improving.  The patient is still too weak to return home.  We will

    get therapies involved.

2. Acute on chronic hypoxic and hypercapnic respiratory failure.  He is

    improving.  We will continue with the BiPAP, but allow him to be off for

    longer periods, like when he is eating, and continue to monitor with

    telemetry.

3. Acute on chronic systolic and diastolic heart failure.  We will continue IV

    Lasix, but decrease to 20 b.i.d. and continue to monitor blood pressures,

    labs, and electrolytes.

4. Atrial fibrillation, rate controlled, but episodes of rapid ventricular

    response.  We will continue him on metoprolol.  I am also going to start

    him on some oral digoxin today.

5. Staph bacteremia, unknown source.  We are continuing the IV vancomycin, now

    day #2; IV Rocephin, day #3, and await further cultures.

6. Type 2 diabetes with hyperglycemia.  He is off his home insulin.  We are

    using meal insulin and long-acting.  I will increase those doses.

7. Rheumatoid arthritis.  Continue low-dose prednisone.

8. Chronic anemia.  Hemoglobin stable.

9. History of pulmonary nodules.

10.Obesity.

11.Palliative care.

 

PLAN:  The patient will continue on acute cares with IV antibiotics as above.

He will continue IV Lasix, but decrease to 20 b.i.d.  We will get him up and

working with therapies tomorrow.  We will repeat lab work.  We will continue

BiPAP, but wean him to be able to eat.  Await further culture results.  Continue

palliative cares here.  The patient is most interested in going home, but

understands this could be a prolonged process, and he is worried about being in

bed and not being able to get up and walk.  I will continue the Esquivel catheter

for now for strict in's and out's and likely remove that tomorrow.

 

 

MKA:  05/31/2020 11:21:34  MODL:  05/31/2020 12:01:03

Job #:  [id number]

## 2020-06-01 NOTE — PN
Progress Note for GILBERT BARBER  Date:  06/01/2020  Room #:  VM.214

 

SUBJECTIVE:  This is hospital day #4 on an 85-year-old admitted with lethargy

due to a hypercapnic COPD exacerbation heart failure and potentially bacteremia,

although he has had no fevers and a normal white count.  He denies any new pains

except that right elbow, which did not look like a large source of infection.

He has been afebrile.  He has been eating 100% of his meals.  He reports having

a bowel movement yesterday with no abdominal pain.  He feels his breathing has

improved.  He is not coughing.  He really wants to go home, but he has not been

up and out of bed much.  He is willing to work with therapy today.  He diuresed

now a total of 3 L and his swelling has improved.

 

OBJECTIVE:  Vital Signs:  His temperature is 97.5, pulse 93, blood pressure

125/59, respiratory rate 20, O2 is 94 on 2.5 L.  Earlier today, he was on BiPAP.

He was 97% on 35% FiO2.  When he first came off the BiPAP this morning, he did

have some tachycardia with heart rates up into the 130s, but that has improved

now.

General:  He is in no acute distress.  He is able to speak in full sentences.

Heart:  Regularly irregular.

Lungs:  Sounds are decreased still, but no crackles.  No wheezes.  He had some

poor respiratory effort.

Abdomen:  Nondistended.  Positive bowel sounds.  Nontender.

Extremities:  Warm and dry.  No edema is appreciated.

Mental Status:  He is alert.  He is orientated x3.

 

LABORATORY DATA:  Lab work does show only a vancomycin level done today mildly

elevated at 24.5.  Pharmacy is adjusting, and then his blood sugars were 296,

294, 326, and 267 this morning.  Culture is still showing Staph species.

 

ASSESSMENT:

1. 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

    His pCO2 was 67 yesterday.  We will continue to use BiPAP at night, but

    will let him be off during the day to see that he tolerates without it.  He

    has trilogy at home, but has not been very compliant with it.

2. Acute on chronic systolic and diastolic heart failure exacerbation.  His EF

    is 40%.  We will switch him over to his home torsemide dosing and hold off

    on further IV Lasix for now.

3. Atrial fibrillation with rapid ventricular response at times.  I have

    started oral digoxin.  We will continue to monitor with telemetry.  He is

    also on metoprolol.  He is on Eliquis.

4. Staphylococcus bacteremia.  We will stop Rocephin today.  He is on day #3

    of IV vancomycin.  We will await final culture results.  Repeat cultures

    have shown no growth.

5. Type 2 diabetes with hyperglycemia.  I have increased his insulin dosing.

6. Rheumatoid arthritis.  He is on low-dose prednisone.

7. Chronic anemia.  Hemoglobin has been stable.  Recheck tomorrow.

8. History of pulmonary nodules.  Appointment for today with the Lung Clinic

    was postponed.

9. Obesity.

10.Palliative cares.

 

PLAN:  At this point, the patient will continue IV antibiotics with IV

vancomycin.  We will put him back on his oral diuretics.  We will continue BiPAP

at night.  We will get him up working with therapies.  Await final culture

results to decide on the definitive antibiotics.  At this point, no source of

infection was noted.  I will remove his Esquivel catheter and hopefully he will be

able to return home or go over to swing bed in the next day.

 

 

MKA:  06/01/2020 10:39:48  MODL:  06/01/2020 11:22:34

Job #:  [id number]

## 2020-06-02 NOTE — PN
Progress Note for GILBERT BARBER  Date:  06/02/2020  Room #:  VM.214

 

SUBJECTIVE:  This is hospital day #5 on an 85-year-old admitted with a CO2

retention, somnolence, and heart failure exacerbation.  He did have positive

blood cultures for staph species, possibly Staph aureus, but no fevers.  No

white count.  No identified source other than a sore on his right elbow which is

sore, but improving and there has been no effusion or drainage.  The patient

states he wants to go home.  He states his breathing is fine.  He is not

coughing as much.  He is still using the BiPAP at night.  He is actually

supposed to be on Trilogy at home, but has not been overly compliant.  His

vancomycin level was high this morning, 2.5 random, but his kidney function has

been excellent.  Blood sugars have improved with increased insulin.  He is

voiding okay with the catheter removed yesterday, but actually wanted back in.

He is eating 100% of his meals.  He diuresed another 1.2 L since yesterday on

his home oral Demadex.

 

PHYSICAL EXAMINATION:

Vital Signs:  Objectively, on his exam this morning, weight is 110 kg,

temperature 96.8, pulse 82, blood pressure 130/75, respiratory rate 20, and O2

saturation of 94%, he is on a couple of liters.  He is on oxygen normally at

home.  His weight during his stay is down about 5 pounds.

General:  He is in no acute distress.

Heart:  Irregularly irregular.

Lungs:  Lung sounds are clear to auscultation without crackles or wheezing.

Abdomen:  Positive bowel sounds.  Soft, nontender. Extremities:  Warm and dry.

No edema that is pitting, just some trace edema in the ankle, left more than the

right.

Mental Status:  He is alert.  He is orientated x3.

 

LABORATORY DATA:  Laboratory work did show white count 4.2, hemoglobin 10.6,

platelets 167.  Sodium 141, potassium 3.7, chloride 95, bicarb 40, BUN 21,

creatinine 0.8.  Glucose 123, it was 179 last night, 170 before that.

Hemoglobin was 11 on admission.

 

ASSESSMENT:

1. Acute-on-chronic hypoxic and hypercapnic respiratory failure, improving.

    His pCO2 was 67 a couple of days ago.  We will continue with BiPAP at

    night, off during the day.

2. Acute-on-chronic diastolic heart failure exacerbation.  EF 40%, doing well.

    We will continue his oral torsemide.

3. Atrial fibrillation with rapid ventricular rates.  They are improved on

    telemetry now after getting the oral digoxin starting earlier this week.

    We will continue to monitor on telemetry.  He had some rates up into the

    120s, but really overall improved.

4. Staphylococcus bacteremia.  Anticipate this could potentially be

    Staphylococcus aureus.  Lab states we might know in the next day or 2.  We

    will continue IV vancomycin.  We will do lab work on that for closer

    monitoring later today.

5. Type 2 diabetes with hyperglycemia, improving with increased insulin.

6. Rheumatoid arthritis, on low-dose 2.5 daily of prednisone.

7. Chronic anemia.  Hemoglobin has been quite stable.  We will hold off on any

    lab monitoring tomorrow.  He is on Eliquis as well for his atrial

    fibrillation.

8. History of pulmonary nodules.  They had been stable.  He follows in the

    Lung Clinic, this was postponed.

9. Obesity.

10.Palliative cares.

 

PLAN:  At this point, the patient will continue with IV vancomycin.  He is on

his oral diuretics.  He is using BiPAP at night.  We will continue telemetry.

We are awaiting further lab results to decide on his definitive antibiotics.

Working with Therapies.  We will get OT involved.  We will see if he is strong

enough to go home with home health and potentially IV antibiotics or stay on

swing bed in the next day or 2.  The patient is making improvements, but is not

ready for swing bed.  He is not indicated to transfer down to [place] given his

palliative care status, this is why he falls outside of the 96-hour window.

 

 

MKA:  06/02/2020 18:32:35  MODL:  06/02/2020 18:59:09

Job #:  [id number]

## 2020-06-03 NOTE — DISCH
PRIMARY DISCHARGE DIAGNOSES:

1. Acute on chronic hypercapnic respiratory failure secondary to chronic

    obstructive pulmonary disease.

2. Acute on chronic combo systolic and diastolic heart failure exacerbation

    with ejection fraction 40%.

3. Coagulase-negative Staphylococcus bacteremia, 2/2 bottles.  Repeat cultures

    negative.  Unclear if it is a contaminant or coming from the wound on his

    right elbow, although it did not show any severe infection.  He is on IV

    antibiotic, day #6.  Plan to complete a full 7-day course.  Atrial

    fibrillation with rapid ventricular rates, improved since starting digoxin.

4. Type 2 diabetes with hyperglycemia.  He has been on adjusting doses of

    insulin.  He is on twice a day at home.  We have been using meal insulin

    and long acting here.

5. Chronic anemia.  His hemoglobin has been stable at 10.6 yesterday.

6. Rheumatoid arthritis.  Sulfasalazine on hold.  He is on low-dose

    prednisone.  He is doing well with that.

7. Obesity and palliative cares and pulmonary nodules.  Following up with the

    lung clinic outpatient.

 

REASON FOR ADMISSION:  On the date of admission, this 85-year-old male was being

more sluggish, tired at home, having more edema, was brought into ER for

evaluation.  His proBNP was elevated.  His chest x-ray was showing pulmonary

edema.  He was not having any fevers.  It was felt he might be having a mild

COPD exacerbation, but he was not even awake or alert enough to take oral

doxycycline.  Therefore, he was started on IV Rocephin.  He was managed with

BiPAP.  His CO2 was 125.  This did come down gradually and he was more alert

with the most recent CO2 at 67.  He was using BiPAP still during the night last

night, but would like to try without it tonight.  He is supposed to be on

Trelegy at home, but he really was not tolerating it that well.  He was then

transitioned to IV vanco on the 05/30/2020 due to concern for possible staph

infection.  He did have some higher vanco levels, but his kidney function always

remained normal.  He was afebrile throughout his stay.  He had a normal white

count.  We adjusted his insulin.  He was getting q.i.d. Accu-Cheks.  He diuresed

several liters and was transitioned back to his oral Demadex.  His leg swelling

improved.  The patient otherwise was working with therapies.  He was quite

deconditioned and they did feel he would benefit from a swing bed stay.

Therefore, the patient is being transitioned over to swing bed for further PT

and OT therapies.  He will complete 1 more day of IV antibiotics.  Given that

the patient preliminarily had coagulase-negative staph, we could change him to

Ancef, however, just 1 more dose of vanco left.  We will continue with that.  No

lab work due tomorrow.  We will likely repeat on Friday.  Otherwise, we will

continue the q.i.d. Accu-Cheks.  We will try him off the BiPAP tonight.  We will

continue his nebulizers.

 

PHYSICAL EXAMINATION:

Discharging Vitals:  Included temperature 98.2, pulse 74, blood pressure 109/57,

respiratory rate 28, and O2 was on 2 L and he was at 96%.

General:  He is in no acute distress.

Heart:  Irregularly irregular.

Lungs:  His lung sounds are decreased throughout, but no rhonchi or wheezing.

Abdomen:  Nondistended.  Positive bowel sounds.  Nontender.

Extremities:  Warm and dry.  He has no edema.

Mental Status:  He is alert.  He is orientated x3.

 

Greater than 30 minutes spent on this discharge process.

 

 

MKA:  06/03/2020 15:54:39  MODL:  06/03/2020 21:56:36

Job #:  [id number]

## 2020-06-08 NOTE — DISCH
PRIMARY DISCHARGE DIAGNOSES:

1. Acute-on-chronic systolic and diastolic heart failure exacerbation with

    known EF of 40%.  No echo planned due to patient's advanced age and medical

    condition.

2. Acute-on-chronic hypoxic and hypercapnic respiratory failure secondary to a

    chronic obstructive pulmonary disease exacerbation and noncompliance with

    home Trilogy.  The patient improved by using BiPAP.

3. Coagulase-negative Staphylococcus contaminant.  No definitive evidence of

    bacteremia was found.  He had completed a full 7-day course of IV

    antibiotics by the time his final culture did come back.  Repeat cultures

    negative.

4. Type 2 diabetes with hyperglycemia.  He was on meal and long-acting

    insulin.  He is on combo twice daily insulin at home.  He did have 1 low of

    55 yesterday.  Otherwise, his blood sugars had been up in the 200s.

5. Chronic anemia.  Hemoglobin was 10.6 on his last check.

6. Rheumatoid arthritis.  Sulfasalazine was held, but can be restarted on

    discharge.  Also on low-dose prednisone.

7. Obesity.

8. Palliative care.

9. Osteoporosis with previous lumbar compression fractures.

10.Atrial fibrillation with rapid ventricular response, improved with starting

    digoxin.

11.Cognitive impairment, improved when his oxygen and respiratory status

    improved.

12.Chewing tobacco use.  He requested a nicotine patch.

13.Chronic hypogonadism.

14.Pulmonary nodules, following with Pulmonary.

 

REASON FOR ADMISSION:  On the date of admission, this 85-year-old male came in

to the emergency room and was very somnolent.  He was very edematous.  He was

having more trouble breathing and getting quite sluggish in the last few days

per his wife.  ABG was showing a CO2 of 125.  His white count was normal with no

fevers.  His proBNP was also quite elevated at 3400.  His troponins were

negative.  Repeat was also negative.  He is on Eliquis for blood thinner.  The

patient was admitted.  He was placed on IV BiPAP.  He was diuresed with IV

Lasix.  He had good improvement in his breathing and swelling.  His CO2

gradually came down.  His heart rates continued to be quite fast.  Therefore, we

gave him oral digoxin. Eventually, they improved and were in the 80s to 100s by

discharge.  The patient was feeling much better.  He was wanting to go home, but

was feeling weaker as he had been in bed for a few days and he was up working

with Therapies and he was ready to go home far before Therapies were done

working with him, but his wife does take care of him at home.  She wanted to

come in and make sure he was walking and moving okay, which he did do on the

afternoon of 06/05/2020, and Therapy felt that the best date for discharge home

would be 06/06/2020.

 

DISCHARGE PLANS/INSTRUCTIONS:  The patient is to follow up with Dr. Peterson in

the clinic in 1 to 2 weeks time.  The patient will have home health on

discharge.  His only new medication will be the digoxin 125 mcg daily.  He will

resume his home insulin along with his sulfasalazine on discharge.  He will have

a BMP and CBC in the clinic in 1 to 2 weeks time.  He will otherwise use his

home Trilogy as recommended.  He will continue same home oxygen 2 L.  Demadex 20

mg twice daily will resume.  He needs no further antibiotics.  He will continue

the same nebulizers.  He will follow up with Pulmonary Clinic about the lung

nodules.  He missed that appointment while he was in our hospital.  He will use

his flutter valve and he will check his blood sugars 4 times a day.

 

PHYSICAL EXAMINATION:

Vital Signs:  On discharge, the day prior when I did my exam, his temperature

was 98.4, pulse 77, blood pressure 104/62, respiratory rate 16, and O2 of 96 on

2 L.

General:  He was in no acute distress.

Heart:  Irregularly irregular.

Lungs:  Lung sounds were decreased throughout with no rhonchi, wheezing, or

crackles appreciated.

Abdomen:  Nondistended, nontender.

Extremities:  Warm and dry, just trace edema to his ankles.

Mental Status:  He is alert, he is orientated x3.  He was quite upset at the

thought that he would not get to go home right away today.

 

Now on Home Health addendum; occurred face-to-face with myself on 06/05/2020.

Primary reason for home health is nursing for teaching and assessments with new

medication changes for his AFib with RVR and monitoring of his Trilogy use.  PT

will work on his gait and mobility.  He is limited by his respiratory failure.

He is homebound due to his impaired cognitive status due to recurrent

respiratory failure as well as needing assistance from another to leave his

home.  He requires taxing effort.  Absences from home are infrequent.  I will

periodically review this plan of care.

 

Date of my exam for the patient was 06/05/2020.

 

 

MKA:  06/06/2020 16:58:12  MODL:  06/06/2020 17:48:46

Job #:  [id number]

## 2021-01-14 NOTE — CR
______________________________________________________________________________   

  

[id number] RAD/RAD Ankle Right 2V  

EXAM: RAD Ankle Right 2V  

   

 INDICATION: POST REDUCTION.  

   

 COMPARISON: January 14, 2021.  

   

 DISCUSSION: Interval reduction of a bimalleolar ankle fracture dislocation.  

 There is persistent posterior displacement of the distal fibula of about 6 mm.  

 Diffuse soft tissue swelling. Osteopenia. Mild to moderate tibiotalar and mid  

 foot osteoarthritis. Ossification along the plantar fascia.  

   

 IMPRESSION:  

 1.  Interval reduction of a bimalleolar ankle fracture dislocation.  

   

 Electronically signed by Rey Barr MD on 1/14/2021 11:11 AM  

   

  

Rey Barr MD                 

 01/14/21 1113    

  

Thank you for allowing us to participate in the care of your patient.

## 2021-01-14 NOTE — EDM.PDOC
ED HPI GENERAL MEDICAL PROBLEM





- General


Stated Complaint: right ankle injury


Time Seen by Provider: 01/14/21 10:15


Source of Information: Reports: Patient, EMS


History Limitations: Reports: No Limitations





- History of Present Illness


INITIAL COMMENTS - FREE TEXT/NARRATIVE: 





Patient comes emergency department today by ambulance for a trauma code with an 

injury to his right ankle.  Just prior to arrival the patient was getting out of

his vehicle when he got his foot between the running board and the tire of his 

pickup subsequently injuring his right ankle.  Upon EMS arrival it was noticed 

that he had an open angulated dislocated right ankle injury.  There was a small 

amount of bleeding.  He had no loss of conscious.  He has no head neck or back 

pain.  Trauma code was activated as he is on a blood thinner and has an open 

fracture of the right lower extremity.  Upon arrival the patient is complaining 

of pain to the right lower extremity.  He denies any other complaint such as 

head neck or back pain.  No loss of consciousness.  He does complain of 

paresthesias to the right lower extremity as well.





- Related Data


                                    Allergies











Allergy/AdvReac Type Severity Reaction Status Date / Time


 


No Known Allergies Allergy   Verified 05/29/20 11:04











Home Meds: 


                                    Home Meds





Metoprolol Succinate [Toprol XL 50mg] 150 mg PO DAILY #90 tab.er 05/24/18 [Rx]


Budesonide [Pulmicort] 0.5 mg NEB BID 08/08/18 [History]


Ferrous Sulfate 325 mg PO DAILY 08/09/18 [History]


Folic Acid 1 mg PO DAILY 08/30/19 [History]


Insuln Asp Prot/Insulin Aspart [NovoLOG Mix 70-30] 24 units SQ BIDMEALS 08/30/19

[History]


sulfaSALAzine [Azulfidine] 1,000 mg PO BID 08/30/19 [History]


Albuterol/Ipratropium [DuoNeb 3.0-0.5 MG/3 ML] 3 ml NEB Q4H PRN  neb 02/10/20 

[Rx]


Apixaban [Eliquis] 5 mg PO BID  tablet 02/10/20 [Rx]


Arformoterol [Brovana] 15 mcg NEB BIDRT  neb 02/10/20 [Rx]


Cholecalciferol (Vitamin D3) [Vitamin D3] 2,000 unit PO DAILY 05/29/20 [History]


Terbinafine [LamISIL AT 1% Crm] 1 applic OP BID 05/29/20 [History]


Torsemide 20 mg PO BID 05/29/20 [History]


predniSONE 2.5 mg PO WITHBREAKFAST 05/29/20 [History]


Acetaminophen [Tylenol Extra Strength] 1,000 mg PO Q6H PRN  tablet 06/05/20 [Rx]


Digoxin 125 mcg PO DAILY #30 tablet 06/05/20 [Rx]


Docusate Sodium/Sennosides [Senna Plus] 2 tab PO DAILY PRN  tablet 06/05/20 [Rx]











Past Medical History


HEENT History: Reports: None


Cardiovascular History: Reports: Afib, Heart Failure, High Cholesterol, 

Hypertension


Respiratory History: Reports: COPD


Other Respiratory History: Pt is on ome O2


Musculoskeletal History: Reports: None, Arthritis, Osteoarthritis, Osteoporosis,

 RA


Endocrine/Metabolic History: Reports: Diabetes, Type II





- Infectious Disease History


Infectious Disease History: Reports: None





- Past Surgical History


Musculoskeletal Surgical History: Reports: None





Social & Family History





- Family History


Respiratory: Reports: None


GI: Reports: None


: Reports: None


Neurological: Reports: MS


Endocrine/Metabolic: Reports: Diabetes, type II





- Caffeine Use


Caffeine Use: Reports: Coffee





Review of Systems





- Review of Systems


Review Of Systems: Comprehensive ROS is negative, except as noted in HPI.





ED EXAM, GENERAL





- Physical Exam


Exam: See Below


Exam Limited By: No Limitations


General Appearance: Alert, WD/WN, No Apparent Distress


Eye Exam: Bilateral Eye: EOMI, PERRL


Ears: Normal External Exam, Normal Canal


Nose: Normal Inspection


Throat/Mouth: Normal Inspection


Head: Atraumatic, Normocephalic


Neck: Normal Inspection, Supple, Non-Tender


Respiratory/Chest: No Respiratory Distress, No Accessory Muscle Use, Chest Non-

Tender, Decreased Breath Sounds (Bilaterally), Wheezing (Faint expiratory 

wheezing bilaterally.  Patient does have a history of COPD.).  No: Accessory 

Muscle Use


Cardiovascular: Normal Peripheral Pulses (Except for the lack of palpable pulses

 in the right lower extremity), Irregularly Irregular


Peripheral Pulses: 0: Posterior Tibial (R), Dorsalis Pedis (R), 1+: Posterior 

Tibial (L), Dorsalis Pedis (L), 2+: Radial (L), Radial (R)


GI/Abdominal: Normal Bowel Sounds, Soft


 (Male) Exam: Deferred


Rectal (Males) Exam: Deferred


Back Exam: Normal Inspection, Full Range of Motion


Extremities: Pedal Edema (1+ bilateral edema).  No: Normal Inspection (Left 

lower extremity is unremarkable.  The right lower extremity just at the ankle 

joint the distal tibia is through a rather large laceration on the medial aspect

 of the right lower extremity.  This is a dislocation of the tibia with an open 

dislocation and fracture.  The foot is rotated 90 degrees laterally as well.  

Capillary refill is at about 3 seconds.  It is mildly dusky.  There is no 

palpable or dopplerable pulses.  Pelvis is stable.  Left lower extremity is 

unremarkable.  Upper extremities are unremarkable.)


Neurological: Alert, Oriented


Psychiatric: Normal Affect, Normal Mood


Skin Exam: Warm, Dry, Intact, Normal Color





Course





- Orders/Labs/Meds


Orders: 


                               Active Orders 24 hr











 Category Date Time Status


 


 Peripheral IV Insertion Adult [OM.PC] Stat Oth  01/14/21 10:18 Ordered











Labs: 


                                Laboratory Tests











  01/14/21 01/14/21 01/14/21 Range/Units





  10:23 10:23 10:23 


 


WBC  5.9    (4.0-10.0)  x10^3/uL


 


RBC  3.65 L    (4.5-6.0)  x10^6/uL


 


Hgb  11.2 L    (14.0-18.0)  g/dL


 


Hct  37.1 L    (40.0-52.0)  %


 


MCV  101.6 H D    (78.0-93.0)  fL


 


MCH  30.7    (26.0-32.0)  pg


 


MCHC  30.2 L    (32.0-36.0)  g/dL


 


RDW Coeff of Brandon  13.1    (10.0-15.0)  %


 


Plt Count  153    (130-400)  x10^3/uL


 


Neut % (Auto)  67.1    (50.0-80.0)  %


 


Lymph % (Auto)  20.7 L    (25.0-50.0)  %


 


Mono % (Auto)  9.5    (2.0-11.0)  %


 


Eos % (Auto)  2.4    (0.0-4.0)  %


 


Baso % (Auto)  0.3    (0.2-1.2)  %


 


PT   10.4   (9.5-12.3)  SEC


 


INR   1.0 L   (2.0-3.5)  


 


APTT   22.6 L   (25.6-32.8)  SEC


 


Sodium    140  (136-145)  mmol/L


 


Potassium    3.7  (3.5-5.1)  mmol/L


 


Chloride    92 L  ()  mmol/L


 


Carbon Dioxide    65 H D  (21-32)  mmol/L


 


Anion Gap    -13.3 L  (5-15)  mmol/L


 


BUN    29 H  (7-18)  mg/dL


 


Creatinine    1.4 H  (0.70-1.30)  mg/dL


 


Est Cr Clr Drug Dosing    TNP  


 


Estimated GFR (MDRD)    48  


 


Glucose    147 H  ()  mg/dL


 


Lactic Acid     (0.4-2.0)  mmol/L


 


Calcium    9.0  (8.5-10.1)  mg/dL


 


Corrected Calcium    9.40  (8.5-10.1)  mg/dL


 


Total Bilirubin    0.3  (0.2-1.0)  mg/dL


 


AST    18  (15-37)  U/L


 


ALT    19  (16-63)  U/L


 


Alkaline Phosphatase    76  ()  U/L


 


Total Protein    7.1  (6.4-8.2)  g/dL


 


Albumin    3.5  (3.4-5.0)  g/dL


 


Globulin    3.6  


 


Albumin/Globulin Ratio    0.97  


 


Ethyl Alcohol    < 3  (0-3)  mg/dL














  01/14/21 Range/Units





  10:23 


 


WBC   (4.0-10.0)  x10^3/uL


 


RBC   (4.5-6.0)  x10^6/uL


 


Hgb   (14.0-18.0)  g/dL


 


Hct   (40.0-52.0)  %


 


MCV   (78.0-93.0)  fL


 


MCH   (26.0-32.0)  pg


 


MCHC   (32.0-36.0)  g/dL


 


RDW Coeff of Brandon   (10.0-15.0)  %


 


Plt Count   (130-400)  x10^3/uL


 


Neut % (Auto)   (50.0-80.0)  %


 


Lymph % (Auto)   (25.0-50.0)  %


 


Mono % (Auto)   (2.0-11.0)  %


 


Eos % (Auto)   (0.0-4.0)  %


 


Baso % (Auto)   (0.2-1.2)  %


 


PT   (9.5-12.3)  SEC


 


INR   (2.0-3.5)  


 


APTT   (25.6-32.8)  SEC


 


Sodium   (136-145)  mmol/L


 


Potassium   (3.5-5.1)  mmol/L


 


Chloride   ()  mmol/L


 


Carbon Dioxide   (21-32)  mmol/L


 


Anion Gap   (5-15)  mmol/L


 


BUN   (7-18)  mg/dL


 


Creatinine   (0.70-1.30)  mg/dL


 


Est Cr Clr Drug Dosing   


 


Estimated GFR (MDRD)   


 


Glucose   ()  mg/dL


 


Lactic Acid  3.4 H*  (0.4-2.0)  mmol/L


 


Calcium   (8.5-10.1)  mg/dL


 


Corrected Calcium   (8.5-10.1)  mg/dL


 


Total Bilirubin   (0.2-1.0)  mg/dL


 


AST   (15-37)  U/L


 


ALT   (16-63)  U/L


 


Alkaline Phosphatase   ()  U/L


 


Total Protein   (6.4-8.2)  g/dL


 


Albumin   (3.4-5.0)  g/dL


 


Globulin   


 


Albumin/Globulin Ratio   


 


Ethyl Alcohol   (0-3)  mg/dL











Meds: 


Medications














Discontinued Medications














Generic Name Dose Route Start Last Admin





  Trade Name Freq  PRN Reason Stop Dose Admin


 


Cefazolin Sodium  2 gm  01/14/21 10:27 





  Ancef  IVPUSH  01/14/21 10:28 





  ONETIME ONE  


 


Hydromorphone HCl  0.5 mg  01/14/21 10:19 





  Dilaudid  IV  01/14/21 10:20 





  ONETIME ONE  


 


Ketamine HCl  Confirm  01/14/21 10:40 





  Ketalar  Administered  01/14/21 10:41 





  Dose  





  200 mg  





  .ROUTE  





  .STK-MED ONE  


 


Midazolam HCl  Confirm  01/14/21 10:40 





  Versed 1 Mg/Ml  Administered  01/14/21 10:41 





  Dose  





  2 mg  





  .ROUTE  





  .STK-MED ONE  


 


Sodium Chloride  10 ml  01/14/21 10:18 





  Saline Flush  FLUSH  





  ASDIRECTED PRN  





  Keep Vein Open  














- Re-Assessments/Exams


Free Text/Narrative Re-Assessment/Exam: 





01/14/21 


Code was activated prior to the patient's arrival and was present upon the 

patient's arrival.





His tetanus status was verified is up-to-date as in 2013.





He has no dopplerable pulses in the right lower extremity.  The CRNA was 

contacted for sedation for reduction due to the concern for lack of blood flow 

to the right lower extremity.





Labs are drawn.





2 g Ancef IV push.





Dilaudid 0.5 mg IV push.





Initial x-ray shows a fracture dislocation of the ankle mortise.  Complete 

dissociation of the tibial plafond from the talar dome.  There is rotation of 

the distal tibia and the fibula approximately 90 degrees in relations to its 

normal anatomic position.





I discussed with the patient about the concerns for vascular compromise in the 

right lower extremity and the need for reduction to improve circulation of the 

right lower extremity.  CRNA was present and discussed moderate sedation with 

the patient.  Verbal consent was obtained from this patient for the emergent 

reduction and moderate sedation due to the vascular compromise of the right 

lower extremity.





A timeout was completed.  Please see the CRNA notes for monitoring and 

medication dosing.





Initially the area on the leg as well as the distal tibia was cleansed with 

chlorhexidine.  After good sedation was verified by the CRNA.  With manual 

traction distraction and medial rotation I easily reduced the open fracture and 

attempted to place it in anatomical position of neutral.  There was no active 

bleeding following the reduction.  There were dopperlable pulses post tibs as 

well as dorsalis pedis. Non-adherent dressing was placed over the large 

laceration where the dislocation happened. ABD dresssing and then padding. A 

sugar tong splint was then applied by myself with orthoglass and secrured with 

an ace wrap.  The coloring of the foot has improved.  The capillary refill is 

under 3 seconds.  Has dopplerable pulses of the right lower extremity.





The patient was monitored further for his moderate sedation by the CRNA.  Please

 see the documentation for the moderate sedation.





I called and spoke with Dr. Bermudez at [facility] in [place]. HPI ER COURSE findings 

and concerns as well as the vascular compromise and the moderate sedation was 

explained to him verbally over the phone. He accepted the patient in transfer at

 this time with no new orders. 





The patient tolerated the procedure well and was still a little sleepy following

 the moderate sedation but was appropriate. The wife was the bedside.  I 

explained the emergent procedure that we needed to do to reduce his foot in the 

improved into his foot.  She is comfortable with the plan of transfer to [place]. 

 Her questions were answered.








Departure





- Departure


Time of Disposition: 10:40


Disposition: DC/Tfer to [facility]


Clinical Impression: 


Open dislocation of distal end of tibia


Qualifiers:


 Encounter type: initial encounter Laterality: right Qualified Code(s): S93.04XA

 - Dislocation of right ankle joint, initial encounter








- Discharge Information


Referrals: 


Chayito Peterson DO [Primary Care Provider] - 


Forms:  Interfacility Transfer EMTALA





- My Orders


Last 24 Hours: 


My Active Orders





01/14/21 10:18


Peripheral IV Insertion Adult [OM.PC] Stat 














- Assessment/Plan


Last 24 Hours: 


My Active Orders





01/14/21 10:18


Peripheral IV Insertion Adult [OM.PC] Stat 











Assessment:: 





Open bimalleolar fracture of the right ankle with dislocation and vascular 

compromise


Emergent reduction of the right above injury due to vascular compromise.


Moderate sedation by the CRNA.


Splinting by myself.


Plan: 





Transferred by ground ambulance to [facility] under the care of Dr. Bermudez 

for further care management.

## 2021-01-14 NOTE — CR
______________________________________________________________________________   

  

[id number] RAD/RAD Ankle Right 2V  

Exam: RAD Ankle Right 2V  

   

 Indication:DISLOCATION OPEN FRACTURE.  

   

 Comparison: No prior imaging for comparison.  

   

 Discussion/Impression:   

   

 Fracture-dislocation of the ankle mortise. Complete dissociation of the tibial  

 plafond from the talar dome.  

   

 Distal tibia and fibula are displaced over a shaft width medial in relation to  

 the foot.  

   

 33 x 18 mm fragment of the distal fibula remains intact along the lateral aspect  

 of the ankle mortise.  

   

 In addition to being dislocated, there is rotation of the distal tibia and  

 fibula approximately 90 degrees in relation to its normal anatomic position.  

   

   

   

 Electronically signed by Moncho Rdz MD on 1/14/2021 10:44 AM  

   

  

Moncho Rdz MD                 

 01/14/21 1047    

  

Thank you for allowing us to participate in the care of your patient.

## 2024-07-12 NOTE — DISCH
"Hx adenomatous polyps with 3yr recall recommended on last colonoscopy performed in 2021    [facility] Screening Colonoscopy Referral Review    Patient meets the inclusion criteria for screening colonoscopy standing order.    Ordering/Referring Provider:  Susanne Marin     BMI: Estimated body mass index is 28.73 kg/m  as calculated from the following:    Height as of 10/9/23: 1.6 m (5' 3\").    Weight as of 10/9/23: 73.6 kg (162 lb 3.2 oz).     Sedation:  Does patient have any of the following conditions affecting sedation?  Hx of tortuous colon or \"twisted colon\": MAC sedation recommended    Previous Scopes:  Any previous recommendations or follow up needs based on previous scope?  na / No recommendations.    Medical Concerns to Postpone Order:  Does patient have any of the following medical concerns that should postpone/delay colonoscopy referral?  No medical conditions affecting colonoscopy referral.    Final Referral Details:  Based on patient's medical history patient is appropriate for referral order with MAC/deep sedation.   BMI<= 45 45 < BMI <= 48 48 < BMI < = 50  BMI > 50   No Restrictions No [facility]  [facility]  [facility] with exceptions Hospital Only OR Only     " PRIMARY DISCHARGE DIAGNOSES:

1. Acute hypoxic respiratory failure related to chronic obstructive pulmonary

    disease and heart failure.

2. Acute diastolic heart failure exacerbation with no known history of heart

    failure, EF 55% back in 2013.

3. COPD exacerbation with chronic obstructive pulmonary disease, unknown

    severity.

4. Atrial fibrillation with rapid ventricular rates seemed to be worsened by

    nebulizer treatments.

5. Type 2 diabetes with hyperglycemia related to steroids, on long-term

    insulin use with complications of nephropathy.

6. Chronic back and hip pain.

7. History of hypogonadism.

8. Mild chronic anemia.

9. Obesity.

10.Smoking.

 

REASON FOR ADMISSION:  On the date of admission, this 82-year-old male came into

the ER with shortness of breath and cough.  He received some steroids,

nebulizers, and IV Lasix, and his symptoms improved.  However, he was hypoxic

with O2 saturations down into the 80% range requiring admission for further

cares.  The patient's blood sugar went up over 400 and he was breathing better

initially, so further steroids were held.  Over the weekend, he was continued on

regular nebulizers and IV Lasix along with oral doxycycline.  Overall, his

condition did improve but not to the point where he was stable for discharge.

Then on his 3rd hospital day, he was having a home O2 eval.  He required 1 L of

oxygen with activity as his sats dropped to 83%, but with 1 L, were over 90%.

He then also started to require IV steroids again as he was wheezing; therefore,

those were given on the day prior to discharge and he was transitioned over to

oral.  He had actually had a low blood sugar into the 50s that morning, so

insulin was held.  He states this is because I just did not eat very much and

then his blood sugar when high again, so we gave him his evening dose early but

tried to avoid extra insulin doses and the blood sugar on the morning of

discharge was at 272 expected due to steroids.  Magnesium was also low and this

was replaced orally.  His creatinine remained stable through his stay and was

0.9.  His potassium was also stable at 4.3, his white count was normal on

discharge.  His hemoglobin was 12.7.  He continued on his home Eliquis for his

DVT prophylaxis and prevention of stroke given his atrial fibrillation.  He was

monitored with telemetry.  Heart rates were between 90 and 110 at the time of

discharge.  He did have some heart rates up to 130 that morning right after neb

treatments.  Short acting nebs again had been discontinued for tachycardia, but

he was on steroid and long-acting bronchodilator neb, budesonide and Brovana.

Otherwise, he was on Spiriva at home.  We discussed trying Stiolto, he was

agreeable to that.  He did well in the hospital, was not smoking and is not

going to return to smoking on discharge.

 

PHYSICAL EXAMINATION:

On discharge,

Vital Signs:  included temperature 97.7, pulse 106, blood pressure 123/90,

respiratory rate 16, and O2 of 97% on room air.

General:  He is in no acute distress.

Heart:  Regularly irregular.

Lungs:  Sounds are clear to auscultation with rare expiratory wheezing.  They

are improved since yesterday.  No crackles noted.  No rhonchi.

Abdomen:  Positive bowel sounds, soft and nontender.

Extremities:  Warm and dry.  No edema.  Much improved since admission.

Mental Status:  He is alert and orientated x3.

 

ASSESSMENT AND PLAN:  Other changes made while he was in the hospital are a

chest x-ray was repeated on the day prior to discharge, which looked excellent

with no pneumonia.  He also was started on digoxin 1 dose given on the morning

prior to discharge.  His metoprolol was increased to 150 mg twice daily on the

day prior to discharge.  Lasix was transitioned over to oral 20 mg twice daily

but will be just 20 mg daily on discharge.  He will be on doxycycline 100 mg

twice daily for another 3 days.  Magnesium, we will continue twice daily.

Prednisone 40 mg daily for another 4 days.  He will see Dr. Peterson on August 25th at 9:50 a.m.  Echo test will be on 09/17.  He will have followup BMP and

mag at his visit.  He will have an overnight oxygen test at home.  He will

continue with 1 L of oxygen with activity.  Otherwise, discharge plans

instructions is as discussed above.  Greater than 30 minutes spent on this

discharge process.

 

 

MKA:  08/08/2017 10:16:11  MODL:  08/08/2017 22:51:54

Job #:  [id number]